# Patient Record
Sex: FEMALE | Race: AMERICAN INDIAN OR ALASKA NATIVE | NOT HISPANIC OR LATINO | Employment: OTHER | URBAN - METROPOLITAN AREA
[De-identification: names, ages, dates, MRNs, and addresses within clinical notes are randomized per-mention and may not be internally consistent; named-entity substitution may affect disease eponyms.]

---

## 2023-08-08 ENCOUNTER — HOSPITAL ENCOUNTER (OUTPATIENT)
Dept: NEUROLOGY | Facility: CLINIC | Age: 68
Setting detail: OBSERVATION
Discharge: HOME | DRG: 100 | End: 2023-08-08
Attending: PSYCHIATRY & NEUROLOGY
Payer: COMMERCIAL

## 2023-08-08 ENCOUNTER — APPOINTMENT (OUTPATIENT)
Dept: CT IMAGING | Facility: CLINIC | Age: 68
DRG: 100 | End: 2023-08-08
Attending: EMERGENCY MEDICINE
Payer: COMMERCIAL

## 2023-08-08 ENCOUNTER — APPOINTMENT (OUTPATIENT)
Dept: MRI IMAGING | Facility: CLINIC | Age: 68
DRG: 100 | End: 2023-08-08
Attending: HOSPITALIST
Payer: COMMERCIAL

## 2023-08-08 ENCOUNTER — HOSPITAL ENCOUNTER (INPATIENT)
Facility: CLINIC | Age: 68
LOS: 2 days | Discharge: HOME | DRG: 100 | End: 2023-08-11
Attending: EMERGENCY MEDICINE | Admitting: HOSPITALIST
Payer: COMMERCIAL

## 2023-08-08 ENCOUNTER — APPOINTMENT (OUTPATIENT)
Dept: GENERAL RADIOLOGY | Facility: CLINIC | Age: 68
DRG: 100 | End: 2023-08-08
Attending: HOSPITALIST
Payer: COMMERCIAL

## 2023-08-08 DIAGNOSIS — R56.9 SEIZURES (H): ICD-10-CM

## 2023-08-08 DIAGNOSIS — I10 BENIGN ESSENTIAL HYPERTENSION: ICD-10-CM

## 2023-08-08 DIAGNOSIS — U07.1 INFECTION DUE TO 2019 NOVEL CORONAVIRUS: Primary | ICD-10-CM

## 2023-08-08 DIAGNOSIS — E78.5 HYPERLIPIDEMIA, UNSPECIFIED HYPERLIPIDEMIA TYPE: ICD-10-CM

## 2023-08-08 LAB
ALBUMIN SERPL BCG-MCNC: 4.5 G/DL (ref 3.5–5.2)
ALP SERPL-CCNC: 78 U/L (ref 35–104)
ALT SERPL W P-5'-P-CCNC: 27 U/L (ref 0–50)
ANION GAP SERPL CALCULATED.3IONS-SCNC: 14 MMOL/L (ref 7–15)
AST SERPL W P-5'-P-CCNC: 27 U/L (ref 0–45)
BASOPHILS # BLD AUTO: 0 10E3/UL (ref 0–0.2)
BASOPHILS NFR BLD AUTO: 0 %
BILIRUB DIRECT SERPL-MCNC: <0.2 MG/DL (ref 0–0.3)
BILIRUB SERPL-MCNC: 0.2 MG/DL
BUN SERPL-MCNC: 17.1 MG/DL (ref 8–23)
CALCIUM SERPL-MCNC: 9.3 MG/DL (ref 8.8–10.2)
CHLORIDE SERPL-SCNC: 104 MMOL/L (ref 98–107)
CREAT SERPL-MCNC: 0.65 MG/DL (ref 0.51–0.95)
DEPRECATED HCO3 PLAS-SCNC: 24 MMOL/L (ref 22–29)
EOSINOPHIL # BLD AUTO: 0.1 10E3/UL (ref 0–0.7)
EOSINOPHIL NFR BLD AUTO: 1 %
ERYTHROCYTE [DISTWIDTH] IN BLOOD BY AUTOMATED COUNT: 12.6 % (ref 10–15)
ETHANOL SERPL-MCNC: <0.01 G/DL
GFR SERPL CREATININE-BSD FRML MDRD: >90 ML/MIN/1.73M2
GLUCOSE BLDC GLUCOMTR-MCNC: 131 MG/DL (ref 70–99)
GLUCOSE BLDC GLUCOMTR-MCNC: 134 MG/DL (ref 70–99)
GLUCOSE SERPL-MCNC: 128 MG/DL (ref 70–99)
HCT VFR BLD AUTO: 40.2 % (ref 35–47)
HGB BLD-MCNC: 12.8 G/DL (ref 11.7–15.7)
HOLD SPECIMEN: NORMAL
HOLD SPECIMEN: NORMAL
IMM GRANULOCYTES # BLD: 0.1 10E3/UL
IMM GRANULOCYTES NFR BLD: 1 %
LYMPHOCYTES # BLD AUTO: 1.3 10E3/UL (ref 0.8–5.3)
LYMPHOCYTES NFR BLD AUTO: 14 %
MCH RBC QN AUTO: 28.3 PG (ref 26.5–33)
MCHC RBC AUTO-ENTMCNC: 31.8 G/DL (ref 31.5–36.5)
MCV RBC AUTO: 89 FL (ref 78–100)
MONOCYTES # BLD AUTO: 0.7 10E3/UL (ref 0–1.3)
MONOCYTES NFR BLD AUTO: 7 %
NEUTROPHILS # BLD AUTO: 7.3 10E3/UL (ref 1.6–8.3)
NEUTROPHILS NFR BLD AUTO: 77 %
NRBC # BLD AUTO: 0 10E3/UL
NRBC BLD AUTO-RTO: 0 /100
PLATELET # BLD AUTO: 213 10E3/UL (ref 150–450)
POTASSIUM SERPL-SCNC: 3.8 MMOL/L (ref 3.4–5.3)
PROT SERPL-MCNC: 7 G/DL (ref 6.4–8.3)
RBC # BLD AUTO: 4.53 10E6/UL (ref 3.8–5.2)
SODIUM SERPL-SCNC: 142 MMOL/L (ref 136–145)
TROPONIN T SERPL HS-MCNC: 10 NG/L
WBC # BLD AUTO: 9.5 10E3/UL (ref 4–11)

## 2023-08-08 PROCEDURE — 96375 TX/PRO/DX INJ NEW DRUG ADDON: CPT

## 2023-08-08 PROCEDURE — 258N000003 HC RX IP 258 OP 636: Performed by: EMERGENCY MEDICINE

## 2023-08-08 PROCEDURE — 250N000011 HC RX IP 250 OP 636: Mod: JZ | Performed by: PSYCHIATRY & NEUROLOGY

## 2023-08-08 PROCEDURE — 85025 COMPLETE CBC W/AUTO DIFF WBC: CPT | Performed by: EMERGENCY MEDICINE

## 2023-08-08 PROCEDURE — 82077 ASSAY SPEC XCP UR&BREATH IA: CPT | Performed by: EMERGENCY MEDICINE

## 2023-08-08 PROCEDURE — 82248 BILIRUBIN DIRECT: CPT | Performed by: EMERGENCY MEDICINE

## 2023-08-08 PROCEDURE — 82310 ASSAY OF CALCIUM: CPT | Performed by: EMERGENCY MEDICINE

## 2023-08-08 PROCEDURE — 70450 CT HEAD/BRAIN W/O DYE: CPT

## 2023-08-08 PROCEDURE — 250N000013 HC RX MED GY IP 250 OP 250 PS 637: Performed by: HOSPITALIST

## 2023-08-08 PROCEDURE — 82962 GLUCOSE BLOOD TEST: CPT

## 2023-08-08 PROCEDURE — 71046 X-RAY EXAM CHEST 2 VIEWS: CPT

## 2023-08-08 PROCEDURE — 258N000003 HC RX IP 258 OP 636: Performed by: HOSPITALIST

## 2023-08-08 PROCEDURE — 250N000011 HC RX IP 250 OP 636: Mod: JZ | Performed by: EMERGENCY MEDICINE

## 2023-08-08 PROCEDURE — 80053 COMPREHEN METABOLIC PANEL: CPT | Performed by: EMERGENCY MEDICINE

## 2023-08-08 PROCEDURE — 36415 COLL VENOUS BLD VENIPUNCTURE: CPT | Performed by: EMERGENCY MEDICINE

## 2023-08-08 PROCEDURE — 99285 EMERGENCY DEPT VISIT HI MDM: CPT | Mod: 25

## 2023-08-08 PROCEDURE — G0378 HOSPITAL OBSERVATION PER HR: HCPCS

## 2023-08-08 PROCEDURE — 255N000002 HC RX 255 OP 636: Performed by: EMERGENCY MEDICINE

## 2023-08-08 PROCEDURE — 96365 THER/PROPH/DIAG IV INF INIT: CPT

## 2023-08-08 PROCEDURE — A9585 GADOBUTROL INJECTION: HCPCS | Performed by: EMERGENCY MEDICINE

## 2023-08-08 PROCEDURE — 80048 BASIC METABOLIC PNL TOTAL CA: CPT | Performed by: EMERGENCY MEDICINE

## 2023-08-08 PROCEDURE — 93005 ELECTROCARDIOGRAM TRACING: CPT

## 2023-08-08 PROCEDURE — 84484 ASSAY OF TROPONIN QUANT: CPT | Performed by: EMERGENCY MEDICINE

## 2023-08-08 PROCEDURE — 250N000011 HC RX IP 250 OP 636

## 2023-08-08 PROCEDURE — 95816 EEG AWAKE AND DROWSY: CPT

## 2023-08-08 PROCEDURE — 99223 1ST HOSP IP/OBS HIGH 75: CPT | Performed by: HOSPITALIST

## 2023-08-08 PROCEDURE — 70553 MRI BRAIN STEM W/O & W/DYE: CPT

## 2023-08-08 PROCEDURE — 96376 TX/PRO/DX INJ SAME DRUG ADON: CPT

## 2023-08-08 RX ORDER — LORAZEPAM 2 MG/ML
0.5 INJECTION INTRAMUSCULAR ONCE
Status: COMPLETED | OUTPATIENT
Start: 2023-08-08 | End: 2023-08-08

## 2023-08-08 RX ORDER — LANOLIN ALCOHOL/MO/W.PET/CERES
3 CREAM (GRAM) TOPICAL
COMMUNITY

## 2023-08-08 RX ORDER — LOSARTAN POTASSIUM 25 MG/1
25 TABLET ORAL 2 TIMES DAILY
Status: ON HOLD | COMMUNITY
End: 2023-08-11

## 2023-08-08 RX ORDER — ACETAMINOPHEN 650 MG/1
650 SUPPOSITORY RECTAL EVERY 6 HOURS PRN
Status: DISCONTINUED | OUTPATIENT
Start: 2023-08-08 | End: 2023-08-11 | Stop reason: HOSPADM

## 2023-08-08 RX ORDER — ONDANSETRON 4 MG/1
4 TABLET, ORALLY DISINTEGRATING ORAL EVERY 6 HOURS PRN
Status: DISCONTINUED | OUTPATIENT
Start: 2023-08-08 | End: 2023-08-11 | Stop reason: HOSPADM

## 2023-08-08 RX ORDER — METOPROLOL TARTRATE 100 MG
50 TABLET ORAL 2 TIMES DAILY
Status: ON HOLD | COMMUNITY
End: 2023-08-11

## 2023-08-08 RX ORDER — SODIUM CHLORIDE 9 MG/ML
INJECTION, SOLUTION INTRAVENOUS CONTINUOUS
Status: DISCONTINUED | OUTPATIENT
Start: 2023-08-08 | End: 2023-08-10

## 2023-08-08 RX ORDER — LORAZEPAM 2 MG/ML
2 INJECTION INTRAMUSCULAR EVERY 6 HOURS PRN
Status: DISCONTINUED | OUTPATIENT
Start: 2023-08-08 | End: 2023-08-11 | Stop reason: HOSPADM

## 2023-08-08 RX ORDER — GADOBUTROL 604.72 MG/ML
8 INJECTION INTRAVENOUS ONCE
Status: COMPLETED | OUTPATIENT
Start: 2023-08-08 | End: 2023-08-08

## 2023-08-08 RX ORDER — LORAZEPAM 2 MG/ML
INJECTION INTRAMUSCULAR
Status: COMPLETED
Start: 2023-08-08 | End: 2023-08-08

## 2023-08-08 RX ORDER — LEVETIRACETAM 10 MG/ML
1000 INJECTION INTRAVASCULAR ONCE
Status: COMPLETED | OUTPATIENT
Start: 2023-08-08 | End: 2023-08-08

## 2023-08-08 RX ORDER — FLUTICASONE PROPIONATE 50 MCG
1 SPRAY, SUSPENSION (ML) NASAL DAILY
COMMUNITY

## 2023-08-08 RX ORDER — METOPROLOL TARTRATE 50 MG
50 TABLET ORAL ONCE
Status: COMPLETED | OUTPATIENT
Start: 2023-08-08 | End: 2023-08-08

## 2023-08-08 RX ORDER — AMLODIPINE BESYLATE 2.5 MG/1
5 TABLET ORAL DAILY
Status: ON HOLD | COMMUNITY
End: 2023-08-11

## 2023-08-08 RX ORDER — CARBOXYMETHYLCELLULOSE SODIUM 5 MG/ML
1 SOLUTION/ DROPS OPHTHALMIC DAILY
COMMUNITY

## 2023-08-08 RX ORDER — LOSARTAN POTASSIUM 25 MG/1
25 TABLET ORAL ONCE
Status: COMPLETED | OUTPATIENT
Start: 2023-08-08 | End: 2023-08-08

## 2023-08-08 RX ORDER — ACETAMINOPHEN 325 MG/1
650 TABLET ORAL EVERY 6 HOURS PRN
Status: DISCONTINUED | OUTPATIENT
Start: 2023-08-08 | End: 2023-08-11 | Stop reason: HOSPADM

## 2023-08-08 RX ORDER — ONDANSETRON 2 MG/ML
4 INJECTION INTRAMUSCULAR; INTRAVENOUS EVERY 6 HOURS PRN
Status: DISCONTINUED | OUTPATIENT
Start: 2023-08-08 | End: 2023-08-11 | Stop reason: HOSPADM

## 2023-08-08 RX ORDER — LEVETIRACETAM 10 MG/ML
1000 INJECTION INTRAVASCULAR EVERY 12 HOURS
Status: DISCONTINUED | OUTPATIENT
Start: 2023-08-09 | End: 2023-08-09

## 2023-08-08 RX ORDER — LOVASTATIN 40 MG
40 TABLET ORAL AT BEDTIME
Status: ON HOLD | COMMUNITY
End: 2023-08-11

## 2023-08-08 RX ORDER — LEVETIRACETAM 750 MG/1
750 TABLET ORAL 2 TIMES DAILY
Status: DISCONTINUED | OUTPATIENT
Start: 2023-08-08 | End: 2023-08-08

## 2023-08-08 RX ADMIN — METOPROLOL TARTRATE 50 MG: 50 TABLET, FILM COATED ORAL at 22:16

## 2023-08-08 RX ADMIN — LORAZEPAM 0.5 MG: 2 INJECTION INTRAMUSCULAR; INTRAVENOUS at 08:27

## 2023-08-08 RX ADMIN — SODIUM CHLORIDE: 9 INJECTION, SOLUTION INTRAVENOUS at 21:09

## 2023-08-08 RX ADMIN — GADOBUTROL 8 ML: 604.72 INJECTION INTRAVENOUS at 20:41

## 2023-08-08 RX ADMIN — LOSARTAN POTASSIUM 25 MG: 25 TABLET, FILM COATED ORAL at 22:16

## 2023-08-08 RX ADMIN — LEVETIRACETAM 1000 MG: 10 INJECTION INTRAVENOUS at 19:34

## 2023-08-08 RX ADMIN — LORAZEPAM 0.5 MG: 2 INJECTION INTRAMUSCULAR at 08:27

## 2023-08-08 RX ADMIN — LEVETIRACETAM 1500 MG: 100 INJECTION, SOLUTION INTRAVENOUS at 08:10

## 2023-08-08 ASSESSMENT — ACTIVITIES OF DAILY LIVING (ADL)
ADLS_ACUITY_SCORE: 35
ADLS_ACUITY_SCORE: 34
ADLS_ACUITY_SCORE: 35

## 2023-08-08 NOTE — LETTER
[facility] NEUROSCIENCE UNIT  [address] [place] [address]  Phone: [phone]    August 9, 2023        Nasrin Singer  [address] [place] [address]          To whom it may concern:    RE: Nasrin Singer    This patient was hospitalized between 8/8/23 - 8/9/23 with medical illness and is hence unable to travel on 8/9/23.    Please contact me for questions or concerns.      Sincerely,      Martine Donaldson MD  Internal Medicine Hospitalist

## 2023-08-08 NOTE — ED NOTES
Patient unsuccessfully trailed off oxygen, remains on 4L via oxymask. Continues to wake up easily to voice, complaining of feeling tired.

## 2023-08-08 NOTE — ED NOTES
Patient resting in bed.  Awakes easily but still is very tired.  Requesting to go to the bathroom, commode brought to room.

## 2023-08-08 NOTE — PROGRESS NOTES
EEG portable done in ED.  Cooperative, awake, alert, drowsy, sleepy pt, photic done and n/c noted in EEG  DR Fields following

## 2023-08-08 NOTE — PHARMACY-ADMISSION MEDICATION HISTORY
Pharmacist Admission Medication History    Admission medication history is complete. The information provided in this note is only as accurate as the sources available at the time of the update.    Medication reconciliation/reorder completed by provider prior to medication history? No    Information Source(s): Patient and CareEverywhere/SureScripts via in-person    Pertinent Information:     Changes made to PTA medication list:  Added: None  Deleted: None  Changed: None    Medication Affordability:       Allergies reviewed with patient and updates made in EHR: no    Medication History Completed By: Alissa Sanchez RPH 8/8/2023 11:47 AM    Prior to Admission medications    Medication Sig Last Dose Taking? Auth Provider Long Term End Date   Acetaminophen 325 MG CAPS Take 325-650 mg by mouth every 4 hours as needed  Yes Unknown, Entered By History     amLODIPine (NORVASC) 2.5 MG tablet Take 5 mg by mouth daily 8/7/2023 Yes Unknown, Entered By History Yes    Ascorbic Acid (VITAMIN C) 500 MG CHEW Take 1 tablet by mouth 2 times daily 8/7/2023 Yes Unknown, Entered By History     calcium carbonate-vitamin D (OSCAL) 500-5 MG-MCG tablet Take 1 tablet by mouth 2 times daily 8/7/2023 Yes Unknown, Entered By History     carboxymethylcellulose PF (REFRESH PLUS) 0.5 % ophthalmic solution Place 1 drop into both eyes daily 8/7/2023 Yes Unknown, Entered By History     fluticasone (FLONASE) 50 MCG/ACT nasal spray Spray 1 spray into both nostrils daily 8/7/2023 at me Yes Unknown, Entered By History     losartan (COZAAR) 25 MG tablet Take 25 mg by mouth 2 times daily 8/7/2023 Yes Unknown, Entered By History Yes    lovastatin (MEVACOR) 40 MG tablet Take 40 mg by mouth At Bedtime 8/7/2023 Yes Unknown, Entered By History Yes    melatonin 3 MG tablet Take 3 mg by mouth nightly as needed for sleep  Yes Unknown, Entered By History     metoprolol tartrate (LOPRESSOR) 100 MG tablet Take 50 mg by mouth 2 times daily 8/7/2023 Yes Unknown, Entered  By History Yes      Alissa EdwardsD

## 2023-08-08 NOTE — ED NOTES
[facility]  ED Nurse Handoff Report    ED Chief complaint: Syncope      ED Diagnosis:   Final diagnoses:   None       Code Status: Full Code    Allergies: No Known Allergies    Patient Story: Patient is from [place] here on layover.  Found by bystanders laying on bathroom floor acting strange.     Focused Assessment:  Patient did have a witnessed seizure here in the ER after walking to the bathroom.  She was in the bed when this happened. She is now sleepy but answering questions appropriately.  Similar episode happened a few months ago, abnormal EEG in June.  Not taking any seizure meds.  Denies heavy alcohol use.     Treatments and/or interventions provided: IV, labs, CT, meds   Patient's response to treatments and/or interventions: tolerated well     To be done/followed up on inpatient unit:  see orders     Does this patient have any cognitive concerns?:  none     Activity level - Baseline/Home:  Independent  Activity Level - Current:   Stand with Assist    Patient's Preferred language: English   Needed?: No    Isolation: None  Infection: Not Applicable  Patient tested for COVID 19 prior to admission: NO  Bariatric?: No    Vital Signs:   Vitals:    08/08/23 0644 08/08/23 0745 08/08/23 0815 08/08/23 0830   BP:  136/67 127/77 121/71   Pulse:  89 92 81   Resp:  16 26 17   Temp:       TempSrc:       SpO2:  98% 94% 96%   Weight: 79.4 kg (175 lb)          Cardiac Rhythm:     Was the PSS-3 completed:   Yes  What interventions are required if any?               Family Comments: none   OBS brochure/video discussed/provided to patient/family: N/A              Name of person given brochure if not patient:               Relationship to patient:     For the majority of the shift this patient's behavior was Green.   Behavioral interventions performed were .    ED NURSE PHONE NUMBER: *[phone]

## 2023-08-08 NOTE — ED TRIAGE NOTES
"Pt arrives via EMS from airport presenting after a syncopal episode. Per EMS, pt is traveling from [place] to [place] and was found at on the bathroom floor \"passed out\" by a bystander. When EMS arrived, pt was alert but not oriented to situation. Pt was not post-ictal per EMS, and is now presenting clear in ED. Pt states \"I don't know what happened, but sometimes stuff like this happens to me.\"       Triage Assessment       Row Name 08/08/23 0637       Triage Assessment (Adult)    Airway WDL WDL       Respiratory WDL    Respiratory WDL WDL       Skin Circulation/Temperature WDL    Skin Circulation/Temperature WDL WDL       Cardiac WDL    Cardiac WDL WDL       Peripheral/Neurovascular WDL    Peripheral Neurovascular WDL WDL       Cognitive/Neuro/Behavioral WDL    Cognitive/Neuro/Behavioral WDL WDL                    "

## 2023-08-08 NOTE — ED PROVIDER NOTES
History   Chief Complaint:  Possible Syncope     The history is provided by the patient, the EMS personnel and medical records.      Nasrin Singer is a 68 year old female with history of hydrocephalus, subarachnoid hemorrhage, and hypertension who presents via EMS from [facility] airport after a possible syncopal episode. According to EMS report, the patient was found lying down on the bathroom floor by a bystander. The patient was not oriented, but was responsive. The patient felt shaky, but was otherwise feeling well. EMS states the patient was not post-ictal. She has no known seizure history.     After about an hour here in the emergency department, the patient had a seizure that lasted about a minute.     Independent Historian:   EMS Report - Supplemental history above. Blood sugar was 140.    Review of External Notes:   I reviewed the patient's neurology visit on 06/05/23 for abnormal EEG. I reviewed the neurology visit on 06/21/23.     EEG REPORT   DATE: 6/5/2023  DEPT: Neurology  FACILITY: [facility]   IMPRESSION:   Abnormal EEG due to the presence of generalized, intermittent, high amplitude, rhythmic sharp and slowing which is consistent with global cerebral dysfunction but is nonspecific as to etiology. At times there was high amplitude rhythmic delta slowing seen over the left frontal region consistent with FIRDA. There were no clear epileptiform discharges or electrographic seizures.  Bandar Canada MD     Medications:    Norvasc   Cozaar  Mevacor  Lopressor     Past Medical History:    Hydrocephalus   GERD  Lumbar spondylosis   Hypertension  Hyperlipidemia   Bilateral piriformis syndrome  Bilateral iliotibial band syndrome  Subarachnoid hemorrhage   Obstructive sleep apnea     Past Surgical History:    Hysterectomy      Physical Exam   Patient Vitals for the past 24 hrs:   BP Temp Temp src Pulse Resp SpO2 Weight   08/08/23 0845 102/60 -- -- 73 16 96 % --   08/08/23 0830 121/71 -- -- 81 17 96 % --   08/08/23  "0815 127/77 -- -- 92 26 94 % --   08/08/23 0745 136/67 -- -- 89 16 98 % --   08/08/23 0644 -- -- -- -- -- -- 79.4 kg (175 lb)   08/08/23 0638 (!) 154/81 98.6  F (37  C) Oral 82 10 98 % --      Physical Exam  Nursing note and vitals reviewed.  Constitutional:  Upon initial evaluation minimally responsive.  However later on she was awake and appropriate.   HENT:   Head:   Atraumatic.  Nose:    Nose normal.   Mouth/Throat:   Mucous membranes are normal.   Eyes:    Conjunctivae normal and EOM are normal.      Pupils are equal, round, and reactive to light.   Neck:    Trachea normal.   Cardiovascular:  Normal rate, regular rhythm, normal heart sounds and normal pulses. No murmur heard.  Pulmonary/Chest:  Effort normal and breath sounds normal.   Abdominal:   Soft. Normal appearance and bowel sounds are normal.      There is no tenderness.      There is no rebound and no CVA tenderness.   Musculoskeletal:  Extremities atraumatic x 4.   Lymphadenopathy:  No cervical adenopathy.   Neurological:   Awake and alert. Normal strength.      No cranial nerve deficit or sensory deficit. GCS eye subscore is 4. GCS verbal subscore is 5. GCS motor subscore is 6.   Skin:    Skin is intact. No rash noted.   Psychiatric:   Normal mood and affect.    Emergency Department Course   ECG  ECG taken at 0647, ECG read at 0650  Normal sinus rhythm  RSR\" or QR pattern in V1 suggests right ventricular conduction delay  Nonspecific T wave abnormality   Abnormal ECG   Rate 87 bpm. MT interval 192 ms. QRS duration 90 ms. QT/QTc 344/413 ms. P-R-T axes 83 -20 37.     Imaging:  CT Head w/o Contrast   Final Result   IMPRESSION:    1. No acute intracranial pathology.   2. Mild leukoaraiosis, presumed sequela of chronic microvascular   ischemic disease.      BRANDAN BARKLEY             SYSTEM ID:  [id number]      Report per radiology    Laboratory:  Labs Ordered and Resulted from Time of ED Arrival to Time of ED Departure   BASIC METABOLIC PANEL - Abnormal "       Result Value    Sodium 142      Potassium 3.8      Chloride 104      Carbon Dioxide (CO2) 24      Anion Gap 14      Urea Nitrogen 17.1      Creatinine 0.65      Calcium 9.3      Glucose 128 (*)     GFR Estimate >90     GLUCOSE BY METER - Abnormal    GLUCOSE BY METER POCT 134 (*)    TROPONIN T, HIGH SENSITIVITY - Normal    Troponin T, High Sensitivity 10     ETHYL ALCOHOL LEVEL - Normal    Alcohol ethyl <0.01     HEPATIC FUNCTION PANEL - Normal    Protein Total 7.0      Albumin 4.5      Bilirubin Total 0.2      Alkaline Phosphatase 78      AST 27      ALT 27      Bilirubin Direct <0.20     CBC WITH PLATELETS AND DIFFERENTIAL    WBC Count 9.5      RBC Count 4.53      Hemoglobin 12.8      Hematocrit 40.2      MCV 89      MCH 28.3      MCHC 31.8      RDW 12.6      Platelet Count 213      % Neutrophils 77      % Lymphocytes 14      % Monocytes 7      % Eosinophils 1      % Basophils 0      % Immature Granulocytes 1      NRBCs per 100 WBC 0      Absolute Neutrophils 7.3      Absolute Lymphocytes 1.3      Absolute Monocytes 0.7      Absolute Eosinophils 0.1      Absolute Basophils 0.0      Absolute Immature Granulocytes 0.1      Absolute NRBCs 0.0        Emergency Department Course & Assessments:     Interventions:  Medications   levETIRAcetam (KEPPRA) 1,500 mg in sodium chloride 0.9 % 100 mL intermittent infusion (0 mg Intravenous Stopped 8/8/23 0837)   LORazepam (ATIVAN) injection 0.5 mg (0.5 mg Intravenous $Given 8/8/23 0827)      Assessments:  0738 RN notified MD that patient was seizing.   0739 Code blue was called.   0743 I obtained history and performed an exam of the patient as documented above.  0907 I rechecked the patient and discussed plan of care. Patient seems more alert.     Independent Interpretation (X-rays, CTs, rhythm strip):  None    Consultations/Discussion of Management or Tests:  0913 I spoke with Dr. Donaldson of the hospitalist service who accepts the patient for admission.     Social  Determinants of Health affecting care:   None    Disposition:  The patient was admitted to the hospital under the care of Dr. Donaldson.     Impression & Plan    Medical Decision Making:  This is a 68-year-old female brought in by EMS from the airport after she was found on the bathroom floor at the airport.  Upon her initial evaluation by my student, she was awake and alert and talking.  She had already been up and walking as well.  However she then had a tonic-clonic seizure witnessed by staff.  This stopped on its own without any intervention.  However she was provided a small dose of IV Ativan later on to help prevent any additional seizures.  She was also loaded with Keppra.  She had the above work-up here as well including an EKG, blood work, and CT scan of her head.  I reviewed her chart from [place], and it appears that she has had 2 prior episodes that were unwitnessed.  She has been seen by cardiology and neurology there without a definitive diagnosis.  However it appears that she likely does have a seizure disorder, which is likely the cause of those previous episodes.  I feel it is best that she be admitted to the hospital here for further evaluation and management.  I subsequently spoke with Dr. Donaldson, who will be taking care of her.    Diagnosis:    ICD-10-CM    1. Seizures (H)  R56.9          Scribe Disclosure:  I, Zulma Smith, am serving as a scribe at 7:48 AM on 8/8/2023 to document services personally performed by Dae Vela MD based on my observations and the provider's statements to me.      Dae Vela MD  08/08/23 0332

## 2023-08-08 NOTE — H&P
[facility]    History and Physical  Hospitalist       Date of Admission:  8/8/2023    Assessment & Plan   Nasrin Singer is a 68 year old female with a history of HTN, prior cryptogenic SAH, JABARI, HLD who presents after being found down on the bathroom floor in the airport and had witnessed seizure episode in the ED.    Seizure, new diagnosis  Postictal encephalopathy  Patient was traveling back home from [place] to [place] with a layover in [place].  She remembers going into the bathroom in the airport around 4:30 AM.  She thinks she must have passed out in the bathroom and may have been down for about 30 minutes when she was found by a bystander.  EMS found her alert but not oriented to situation.  In the ED, she was noted to have stable vitals, .  EKG with no acute findings.  Head CT with no acute findings.  Labs including troponin T, Ethyl alcohol completely unremarkable.  Patient had a witnessed generalized tonic-clonic seizure episode in the ED lasting about 30 to 60 seconds which terminated spontaneously.  Patient was given 0.5 Mg IV Ativan, also loaded with 1500 Mg IV Keppra and is admitted under observation status for further management.  She had an unexplained SAH in 2019.  She has also had 2 episodes of loss of awareness, first on 8/2020, second on 4/2023.  She has undergone cardiac and neurological evaluation for these.  Per last neurology note dated 6/21/2023: She recently obtained an EEG which was without epileptic activity but notable left frontal intermittent rhythmic delta activity, intermittent generalized slowing. She also underwent 30 day cardiac monitor and TTE recently with no acute findings to explain these episodes.    -Registered observation status  -Consult to neurology  -Neurochecks  -Seizure precautions, as needed IV Ativan available  -MRI brain ordered.  Last MRI brain was in 2019 after she had cryptogenic SAH.  -Defer antiepileptic drug  management to neurology  -IV NS hydration    H/o Cryptogenic SAH  Patient was admitted on 9/28/19 for nontraumatic subarachnoid hemorrhage. She presented to the hospital after sudden onset 10/10 headache. In th ED was diagnosed with SAH. Patient had two catheter angiograms and brain/cervical spine MRI and all unrevealing for vascular anomaly but first dsa complicated by left posterior cerebral artery strokes and right frontal subcortical stroke s/p with Integrilin; clinically silent.   -Noted    JABARI  -Does not appear to be on treatment    HLD  -Continue PTA meds when verified per pharmacy    HTN  -Continue PTA meds when verified per pharmacy    DVT Prophylaxis: Low Risk/Ambulatory with no VTE prophylaxis indicated  Code Status: Full Code     Expected Discharge Date: 08/09/2023               Martine Donaldson MD    Medical Decision Making       Please see A&P for additional details of medical decision making.         Clinically Significant Risk Factors Present on Admission                                  Primary Care Physician   Physician No Ref-Primary    Chief Complaint   Found down on the floor    History is obtained from the patient, ED provider and chart review    History of Present Illness   Nasrin Singer is a 68 year old female with a history of HTN, prior cryptogenic SAH, JABARI, HLD who presents after being found down on the bathroom floor in the airport and had witnessed seizure episode in the ED.    Patient was traveling back home from [place] to [place] with a layover in [place].  She remembers going into the bathroom in the airport around 4:30 AM.  She thinks she must have passed out in the bathroom and may have been down for about 30 minutes when she was found by a bystander.  EMS found her alert but not oriented to situation.  In the ED, she was noted to have stable vitals, .  EKG with no acute findings.  Head CT with no acute findings.  Labs including troponin T, Ethyl alcohol  "completely unremarkable.  Patient had a witnessed generalized tonic-clonic seizure episode in the ED lasting about 30 to 60 seconds which terminated spontaneously.  Patient was given 0.5 Mg IV Ativan, also loaded with 1500 Mg IV Keppra and is admitted under observation status for further management.    Patient does not smoke.  Drinks alcohol occasionally.  Lives alone outside [place].  Is a retired nurse.    She had an unexplained SAH in 2019.  She has also had 2 episodes of loss of awareness, first on 8/2020, second on 4/2023.  She has undergone cardiac and neurological evaluation for these.  Per last neurology note dated 6/21/2023: During first event she had zero prodrome and then presumed fell over forward into table then to the floor, woke up, felt \"shaky\", denies confusion, feels was out for 2 hours, no muscle soreness but had bruising from the fall, no incontinence, no tongue biting. The next day woke up and was feeling well. First episode was felt to be a hypotensive episode and her blood pressure medications were reduced, stopped 2 anti-HTN and continued metoprolol, had + orthostatics with a drop of 10 on SBP. Eventually started losartan/amlodipine, remains on metoprolol. Her BPs at home 108-135, denies orthostasis.  During second event notes identical semiology, no prodrome, fell, woke up feeling relatively well beyond feeling shaky and bruised, but felt had lost 2 hours.  She recently obtained an EEG which was without epileptic activity but notable left frontal intermittent rhythmic delta activity, intermittent generalized slowing. She also underwent 30 day cardiac monitor and TTE recently with no acute findings to explain these episodes.      Past Medical History    I have reviewed this patient's medical history and updated it with pertinent information if needed.   HTN  SAH  HLD  JABARI    Past Surgical History   I have reviewed this patient's surgical history and updated it with pertinent information " if needed.  Total hysterectomy    Prior to Admission Medications   None     Allergies   No Known Allergies    Social History   I have reviewed this patient's social history and updated it with pertinent information if needed. Nasrin Singer  does not smoke, occasionally drinks alcohol, is a retired nurse    Family History   Family history reviewed with patient and is noncontributory.    Review of Systems   The 10 point Review of Systems is negative other than noted in the HPI or here.     Physical Exam   Temp: 98.6  F (37  C) Temp src: Oral BP: 102/60 Pulse: 73   Resp: 16 SpO2: 96 % O2 Device: None (Room air)    Vital Signs with Ranges  Temp:  [98.6  F (37  C)] 98.6  F (37  C)  Pulse:  [73-92] 73  Resp:  [10-26] 16  BP: (102-154)/(60-81) 102/60  SpO2:  [94 %-98 %] 96 %  175 lbs 0 oz    Constitutional: cooperative, no apparent distress.  Patient is drowsy but easily arousable  Eyes: no icterus, EOMs intact  HEENT: Moist mucous membranes  Respiratory: Clear to auscultation bilaterally, no crackles or wheezing.  On 2 L nasal cannula oxygen  Cardiovascular: Regular rate and rhythm, normal S1 and S2, and no murmur noted.  GI: Soft, non-distended, non-tender, normal bowel sounds.  Skin: No rashes, no cyanosis, no edema.  Musculoskeletal: No joint swelling, erythema or tenderness.  Neurologic: Drowsy but easily arousable, somewhat slow to respond but gets the right response after multiple attempts, engages in appropriate conversation, no facial asymmetry, moving all extremities, fluent speech  Psychiatric: Calm and pleasant, no obvious anxiety or depression.     Data   Data reviewed today:  I personally reviewed CT scan with result as noted above  Recent Labs   Lab 08/08/23  0748 08/08/23  0650   WBC  --  9.5   HGB  --  12.8   MCV  --  89   PLT  --  213   NA  --  142   POTASSIUM  --  3.8   CHLORIDE  --  104   CO2  --  24   BUN  --  17.1   CR  --  0.65   ANIONGAP  --  14   NORBERTO  --  9.3   * 128*   ALBUMIN  --  4.5    PROTTOTAL  --  7.0   BILITOTAL  --  0.2   ALKPHOS  --  78   ALT  --  27   AST  --  27       Recent Results (from the past 24 hour(s))   CT Head w/o Contrast    Narrative    EXAM: CT HEAD W/O CONTRAST  8/8/2023 8:08 AM     HISTORY: seizures       COMPARISON: None    TECHNIQUE: Using multidetector thin collimation helical acquisition  technique, axial, coronal and sagittal CT images from the skull base  to the vertex were obtained without intravenous contrast.   (topogram) image(s) also obtained and reviewed.    FINDINGS:  No acute intracranial hemorrhage, mass effect, or midline shift.  Scattered areas of subcortical white matter hypoattenuation likely  related to chronic small vessel ischemic disease. Suspect old  cerebellar lacunar infarct. No CT findings of acute infarct or  hydrocephalus. Preserved subarachnoid spaces.    Right parietal scalp hematoma. No underlying fracture. No substantial  paranasal sinus mucosal disease. Clear mastoid air cells. Nonfocal  orbits.       Impression    IMPRESSION:   1. No acute intracranial pathology.  2. Mild leukoaraiosis, presumed sequela of chronic microvascular  ischemic disease.    BRANDAN BARKLEY DO         SYSTEM ID:  [id number]

## 2023-08-08 NOTE — ED NOTES
Patient transported down to CT and back on monitor.  Remains vitally stable.  Opens eyes when called but does not answer questions yet.

## 2023-08-08 NOTE — CONSULTS
DATE OF SERVICE : 8/8/2023    DATE OF ADMISSION: 8/8/2023    NEUROLOGICAL CONSULTATION    REQUESTED BY Dr. Vela, Dae BECKFORD MD    SOURCE OF INFORMATION:Patient and EHR    REASON FOR CONSULTATION:   Seizure    HISTORY OF PRESENT ILLNESS:     She is 68 years old female with history of subarachnoid hemorrhage 2019 hypertension hyperlipidemia obstructive sleep apnea presented to the ED after [place] the airport & had witnessed seizure-like activity in the emergency room.  She was traveling back home from [place] to [place] with a layover in [place] she remembers going into the bathroom she was found down by bystanders in the bathroom. She is sleep deprived.  In the ED vitals were stable head CT with no acute pathology blood alcohol negative she had witnessed generalized tonic seizure in the emergency room lasting for about 30 to 60 seconds did receive IV Ativan 0.5 mg.  S/p Keppra load 1500 mg.  Prior spells of lack of awareness 8/2020 and second spell 4/2023.    Prior similar spells followed by neurologist similar spells 8/2020 and another spell 4/2023 she was unresponsive for about 2 hours no warning prior to loss of consciousness she wakes up after few hours.    Prior MRI head 9/2019 small acute ischemic infarcts involving left parieto-occipital region left cerebellum and right frontal subcortical white matter.  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    Meds:   Current Outpatient Medications   Medication Sig Dispense Refill     Acetaminophen 325 MG CAPS Take 325-650 mg by mouth every 4 hours as needed       amLODIPine (NORVASC) 2.5 MG tablet Take 5 mg by mouth daily       Ascorbic Acid (VITAMIN C) 500 MG CHEW Take 1 tablet by mouth 2 times daily       calcium carbonate-vitamin D (OSCAL) 500-5 MG-MCG tablet Take 1 tablet by mouth 2 times daily       carboxymethylcellulose PF  (REFRESH PLUS) 0.5 % ophthalmic solution Place 1 drop into both eyes daily       fluticasone (FLONASE) 50 MCG/ACT nasal spray Spray 1 spray into both nostrils daily       losartan (COZAAR) 25 MG tablet Take 25 mg by mouth 2 times daily       lovastatin (MEVACOR) 40 MG tablet Take 40 mg by mouth At Bedtime       melatonin 3 MG tablet Take 3 mg by mouth nightly as needed for sleep       metoprolol tartrate (LOPRESSOR) 100 MG tablet Take 50 mg by mouth 2 times daily         Meds:  Current Outpatient Medications   Medication Sig Dispense Refill     Acetaminophen 325 MG CAPS Take 325-650 mg by mouth every 4 hours as needed       amLODIPine (NORVASC) 2.5 MG tablet Take 5 mg by mouth daily       Ascorbic Acid (VITAMIN C) 500 MG CHEW Take 1 tablet by mouth 2 times daily       calcium carbonate-vitamin D (OSCAL) 500-5 MG-MCG tablet Take 1 tablet by mouth 2 times daily       carboxymethylcellulose PF (REFRESH PLUS) 0.5 % ophthalmic solution Place 1 drop into both eyes daily       fluticasone (FLONASE) 50 MCG/ACT nasal spray Spray 1 spray into both nostrils daily       losartan (COZAAR) 25 MG tablet Take 25 mg by mouth 2 times daily       lovastatin (MEVACOR) 40 MG tablet Take 40 mg by mouth At Bedtime       melatonin 3 MG tablet Take 3 mg by mouth nightly as needed for sleep       metoprolol tartrate (LOPRESSOR) 100 MG tablet Take 50 mg by mouth 2 times daily      No Known Allergies      PHYSICAL EXAM  /68   Pulse 76   Temp 98.6  F (37  C) (Oral)   Resp 21   Wt 79.4 kg (175 lb)   SpO2 93%         On general examination the patient was in no acute distress. No labored breathing. Mood and affect was normal  No clubbing,cyanosis, pedal edema in extremities     Neurologic:  Mental status: alert and oriented  to the current situations  fund of knowledge intact.  Intact attention Speech and Language: Comprehension and spontaneous speech are normal. No dysarthria.  Pupils equal and round reacting to light, visual fields  were full, Face -symmetric, Eye closure- intact. Tongue movements- normal ; bruising right lateral aspect of the tongue motor strength- moves all four limbs equally ; reflexes 2+ upper limb 2 left upper limb and lower extremities no tremors or involuntary movements    Lab and X-ray:   Recent Labs   Lab Test 08/08/23  0650   WBC 9.5   HGB 12.8      POTASSIUM 3.8     Recent Labs   Lab Test 08/08/23  0650   POTASSIUM 3.8   CHLORIDE 104   BUN 17.1     Recent Labs   Lab Test 08/08/23  0650   WBC 9.5   HGB 12.8   MCV 89        Recent Labs   Lab Test 08/08/23  0650   AST 27   ALT 27   ALKPHOS 78     No lab results found.    Invalid input(s): CHOLESTEROL, TRIGLYCERIDES  No lab results found.    Laboratory results were personally interpreted and reviewed in detail.  Imaging studies reviewed and interpreted in detail      Summary: List Problems:   Patient Active Problem List   Diagnosis     Seizures (H)       ASSESSMENT:   68-year-old with history of subarachnoid hemorrhage, ischemic infarcts 2019 presenting with stereotypical spells of loss of consciousness for several hours with no aura.  She had a witnessed spell of tonic-clonic seizure activity in ED lasting less than 1 minute.    Stereotypical spells suggestive of underlying seizure disorder    Start Keppra 750 mg twice daily     EEG and MRI pending    Physical therapy    No driving    Follow-up with primary neurologist    Call us with any questions      Thank you for the opportunity to provide consultation on Nasrin Singer

## 2023-08-08 NOTE — PROGRESS NOTES
Neurology    Abnormal EEG with frequent cortical irritability left frontotemporal regions     PLAN    IV Keppra 1000 mg x 1   Increase maintenance Keppra 1000 mg BID     Daniela Fields MD  [facility]  Office Phone [phone]         CC: Warts    HPI: The patient is a 13 year old male who presents for wart\, present for a couple of years.  They are located on the left thumb.      Previous treatment: LN x 2 and OTC wart treatments     PMH:  Healthy    Current Outpatient Medications   Medication Sig Dispense Refill   • gentamicin (GARAMYCIN) 0.3 % ophthalmic solution Place 1 drop into both eyes 4 times daily. 5 mL 0     No current facility-administered medications for this visit.        ALLERGIES:   Allergen Reactions   • Penicillins RASH       PE:  Well-appearing male in no acute distress.  Pate skin type II.  Skin exam included the hands, fingernails, face:  -1 verrucous papules on the left volar thumb    No additional concerning lesions or rash were noted.        Assessment/Plan:  1. Verruca - The diagnosis and etiology was discussed.  Discussed treatment options, and likely need for multiple treatments.    -Rx wart peel (a custom compounded topical) to use nightly. It may cause surrounding skin irritation.  The compounding pharmacy will call the family to obtain payment, then mail the medication    Follow-up PRN    On 10/7/2019, Munira GUERRERO Herberth, MA scribed the services personally performed by Dr. Lisa Muchard  The documentation recorded by the scribe accurately and completely reflects the service(s) I personally performed and the decisions made by me.

## 2023-08-08 NOTE — ED NOTES
Patient had full body seizure like activity lasting about a minute.  She stopped shaking after about a minute and was postictal.  MD called to room.  Ativan not given at this time.  Respirations stable with 4L via oxy mask.

## 2023-08-09 ENCOUNTER — HOSPITAL ENCOUNTER (OUTPATIENT)
Dept: NEUROLOGY | Facility: CLINIC | Age: 68
Setting detail: OBSERVATION
Discharge: HOME | DRG: 100 | End: 2023-08-09
Attending: PSYCHIATRY & NEUROLOGY
Payer: COMMERCIAL

## 2023-08-09 LAB
ATRIAL RATE - MUSE: 87 BPM
DIASTOLIC BLOOD PRESSURE - MUSE: NORMAL MMHG
GLUCOSE BLDC GLUCOMTR-MCNC: 97 MG/DL (ref 70–99)
INTERPRETATION ECG - MUSE: NORMAL
P AXIS - MUSE: 83 DEGREES
PR INTERVAL - MUSE: 192 MS
QRS DURATION - MUSE: 90 MS
QT - MUSE: 344 MS
QTC - MUSE: 413 MS
R AXIS - MUSE: -20 DEGREES
SYSTOLIC BLOOD PRESSURE - MUSE: NORMAL MMHG
T AXIS - MUSE: 37 DEGREES
VENTRICULAR RATE- MUSE: 87 BPM

## 2023-08-09 PROCEDURE — 82962 GLUCOSE BLOOD TEST: CPT

## 2023-08-09 PROCEDURE — 96376 TX/PRO/DX INJ SAME DRUG ADON: CPT

## 2023-08-09 PROCEDURE — 250N000013 HC RX MED GY IP 250 OP 250 PS 637: Performed by: HOSPITALIST

## 2023-08-09 PROCEDURE — 99233 SBSQ HOSP IP/OBS HIGH 50: CPT | Performed by: HOSPITALIST

## 2023-08-09 PROCEDURE — 120N000001 HC R&B MED SURG/OB

## 2023-08-09 PROCEDURE — G0378 HOSPITAL OBSERVATION PER HR: HCPCS

## 2023-08-09 PROCEDURE — 95816 EEG AWAKE AND DROWSY: CPT

## 2023-08-09 PROCEDURE — 258N000003 HC RX IP 258 OP 636: Performed by: HOSPITALIST

## 2023-08-09 PROCEDURE — 250N000011 HC RX IP 250 OP 636: Mod: JZ | Performed by: PSYCHIATRY & NEUROLOGY

## 2023-08-09 RX ORDER — METOPROLOL TARTRATE 50 MG
50 TABLET ORAL 2 TIMES DAILY
Status: DISCONTINUED | OUTPATIENT
Start: 2023-08-09 | End: 2023-08-11 | Stop reason: HOSPADM

## 2023-08-09 RX ORDER — LEVETIRACETAM 500 MG/1
1000 TABLET ORAL 2 TIMES DAILY
Status: DISCONTINUED | OUTPATIENT
Start: 2023-08-09 | End: 2023-08-11 | Stop reason: HOSPADM

## 2023-08-09 RX ADMIN — SODIUM CHLORIDE: 9 INJECTION, SOLUTION INTRAVENOUS at 03:34

## 2023-08-09 RX ADMIN — ACETAMINOPHEN 650 MG: 325 TABLET, FILM COATED ORAL at 19:59

## 2023-08-09 RX ADMIN — LEVETIRACETAM 1000 MG: 500 TABLET, FILM COATED ORAL at 19:59

## 2023-08-09 RX ADMIN — SODIUM CHLORIDE: 9 INJECTION, SOLUTION INTRAVENOUS at 16:17

## 2023-08-09 RX ADMIN — LEVETIRACETAM 1000 MG: 10 INJECTION INTRAVENOUS at 06:06

## 2023-08-09 RX ADMIN — METOPROLOL TARTRATE 50 MG: 50 TABLET, FILM COATED ORAL at 20:00

## 2023-08-09 RX ADMIN — METOPROLOL TARTRATE 50 MG: 50 TABLET, FILM COATED ORAL at 10:20

## 2023-08-09 ASSESSMENT — ACTIVITIES OF DAILY LIVING (ADL)
ADLS_ACUITY_SCORE: 35
ADLS_ACUITY_SCORE: 34

## 2023-08-09 NOTE — PROGRESS NOTES
ASSESSMENT/PLAN:   68-year-old with history of subarachnoid hemorrhage, ischemic infarcts 2019 presenting with stereotypical spells of loss of consciousness for several hours with no aura.  She had a witnessed spell of tonic-clonic seizure activity in ED lasting less than 1 minute. Stereotypical spells suggestive of underlying seizure disorder     Keppra 1000 mg BID  Repeat EEG today   No driving  Follow-up with primary neurologist  Call us with any questions    24 HOUR EVENTS:                   Current spells doing well on current dose of Keppra residual being tired MR brain no acute stroke  EXAMINATION:   Past medical history, family history, social history and review of systems are unchanged except as noted below.    VITAL SIGNS:   /62   Pulse 74   Temp 98.4  F (36.9  C) (Oral)   Resp 16   Wt 79.4 kg (175 lb)   SpO2 95%       On general examination the patient was in no acute distress. No labored breathing. Mood and affect was normal  No clubbing,cyanosis, pedal edema in extremities     Neurologic:  Mental status: alert and oriented  to the current situations  fund of knowledge intact.  Intact attention Speech and Language: Comprehension and spontaneous speech are normal. No dysarthria. Visual fields were full, Face -symmetric Eye closure- intact. Tongue movements- normal ; Motor strength- moves all four limbs equally ; No tremors or involuntary movements    PERTINENT DATA:  Lab and X-ray: Recent Labs   Lab Test 08/08/23  0650   WBC 9.5   HGB 12.8      POTASSIUM 3.8     [unfilled]  Recent Labs   Lab Test 08/08/23  0650   POTASSIUM 3.8   CHLORIDE 104   BUN 17.1     Recent Labs   Lab Test 08/08/23  0650   WBC 9.5   HGB 12.8   MCV 89        Recent Labs   Lab Test 08/08/23  0650   AST 27   ALT 27   ALKPHOS 78     No lab results found.    Invalid input(s): CHOLESTEROL, TRIGLYCERIDES  No lab results found.    Laboratory results were personally interpreted and reviewed in  detail.    Imaging studies reviewed and interpreted in detail    Summary: List Problems:   Patient Active Problem List   Diagnosis     Seizures (H)

## 2023-08-09 NOTE — PROGRESS NOTES
[facility]    Hospitalist Progress Note    Date of Admission:  8/8/2023    Assessment & Plan     Nasrin Singer is a 68 year old female with a history of HTN, prior cryptogenic SAH, JABARI, HLD who presents after being found down on the bathroom floor in the airport and had witnessed seizure episode in the ED.     Seizure, new diagnosis  Postictal encephalopathy  Patient was traveling back home from [place] to [place] with a layover in [place].  She remembers going into the bathroom in the airport around 4:30 AM.  She thinks she must have passed out in the bathroom and may have been down for about 30 minutes when she was found by a bystander.  EMS found her alert but not oriented to situation.  In the ED, she was noted to have stable vitals, .  EKG with no acute findings.  Head CT with no acute findings.  Labs including troponin T, Ethyl alcohol completely unremarkable.  Patient had a witnessed generalized tonic-clonic seizure episode in the ED lasting about 30 to 60 seconds which terminated spontaneously.  Patient was given 0.5 Mg IV Ativan, also loaded with 1500 Mg IV Keppra and is admitted under observation status for further management.  She had an unexplained SAH in 2019.  She has also had 2 episodes of loss of awareness, first on 8/2020, second on 4/2023.  She has undergone cardiac and neurological evaluation for these.  Per last neurology note dated 6/21/2023: She recently obtained an EEG which was without epileptic activity but notable left frontal intermittent rhythmic delta activity, intermittent generalized slowing. She also underwent 30 day cardiac monitor and TTE recently with no acute findings to explain these episodes.    -Consult to neurology.  EEG done that was abnormal with frequent cortical irritability in the left frontotemporal regions.  Patient was given additional IV Keppra 1000Mg evening of 8/8 and started on maintenance Keppra 1000 mg twice  daily.  -Neurochecks  -Seizure precautions, as needed IV Ativan available  -MRI brain did not show acute infarct, showed right inferior cerebellum small chronic infarct.  Incidental right parotid gland 6 mm nodule/cyst noted.    -Repeat EEG on 8/9 continue to show focal epileptogenic activity at left temporal region.  Neurology recommended monitoring for 24 hours, can be discharged after that if no clinical spells.  Continue on Keppra 1000 mg twice daily.     H/o Cryptogenic SAH  Patient was admitted on 9/28/19 for nontraumatic subarachnoid hemorrhage. She presented to the hospital after sudden onset 10/10 headache. In th ED was diagnosed with SAH. Patient had two catheter angiograms and brain/cervical spine MRI and all unrevealing for vascular anomaly but first dsa complicated by left posterior cerebral artery strokes and right frontal subcortical stroke s/p with Integrilin; clinically silent.   -Noted     JABARI  -Does not appear to be on treatment     HTN  -Continue PTA metoprolol.  Holding PTA losartan and amlodipine.     HLD  -Continue PTA lovastatin     DVT Prophylaxis: Low Risk/Ambulatory with no VTE prophylaxis indicated  Code Status: Full Code         Medical Decision Making       Please see A&P for additional details of medical decision making.        Clinically Significant Risk Factors Present on Admission                  # Hypertension: Home medication list includes antihypertensive(s)                 Martine Donaldson MD  Text Page (7am - 6pm, M-F)  [facility]  Securely message with the Vocera Web Console (learn more here)  Text page via [employer] Paging/Directory      Interval History   Stable overnight with no further seizure activity.  Denies any headache.  Cousin at bedside.  No focal numbness or weakness.    -Data reviewed today: I reviewed all new labs and imaging results over the last 24 hours. I personally reviewed MRI scan with result as noted above    Physical Exam   Temp:  98.6  F (37  C) Temp src: Oral BP: 115/68 Pulse: 67   Resp: 18 SpO2: 94 % O2 Device: None (Room air) Oxygen Delivery: 2 LPM  Vitals:    08/08/23 0644   Weight: 79.4 kg (175 lb)     Vital Signs with Ranges  Temp:  [98.1  F (36.7  C)-98.7  F (37.1  C)] 98.6  F (37  C)  Pulse:  [62-76] 67  Resp:  [16-18] 18  BP: (109-139)/(62-84) 115/68  SpO2:  [94 %-99 %] 94 %  I/O last 3 completed shifts:  In: 1000 [I.V.:1000]  Out: -     Constitutional: Alert, appears comfortable, in no acute distress  Respiratory: Non labored breathing, clear to auscultation bilaterally, no crackles or wheezes  Cardiovascular: Heart sounds regular rate and rhythm, no murmurs, no leg edema  GI: Abdomen is soft, non distended, non tender. Normal BS  Skin/Integumen: no rashes, no pressure sores  Neuro: alert, converses appropriately, moving all extremities, fluent speech, no facial asymmetry  Psych: mood and affect appropriate      Medications    sodium chloride 100 mL/hr at 08/09/23 1617      levETIRAcetam  1,000 mg Oral BID    metoprolol tartrate  50 mg Oral BID       Data   Recent Labs   Lab 08/09/23  0155 08/08/23  2133 08/08/23  0748 08/08/23  0650   WBC  --   --   --  9.5   HGB  --   --   --  12.8   MCV  --   --   --  89   PLT  --   --   --  213   NA  --   --   --  142   POTASSIUM  --   --   --  3.8   CHLORIDE  --   --   --  104   CO2  --   --   --  24   BUN  --   --   --  17.1   CR  --   --   --  0.65   ANIONGAP  --   --   --  14   NORBERTO  --   --   --  9.3   GLC 97 131* 134* 128*   ALBUMIN  --   --   --  4.5   PROTTOTAL  --   --   --  7.0   BILITOTAL  --   --   --  0.2   ALKPHOS  --   --   --  78   ALT  --   --   --  27   AST  --   --   --  27       Imaging  Recent Results (from the past 24 hour(s))   MR Brain w/o & w Contrast    Narrative    EXAM: MR BRAIN W/O and W CONTRAST  LOCATION: [facility]  DATE: 8/8/2023    INDICATION: new onset seizure  COMPARISON: CT head 08/08/2023  CONTRAST: 8mL Gadavist  TECHNIQUE: Routine  multiplanar multisequence head MRI without and with intravenous contrast.    FINDINGS:  Seizure protocol not performed. No coronal T2 performed. No coronal FLAIR performed. No thin section T1 sequence performed.    INTRACRANIAL CONTENTS: No acute or subacute infarct. No mass, acute hemorrhage, or extra-axial fluid collections. Patchy nonspecific T2/FLAIR hyperintensities within the cerebral white matter most consistent with mild to moderate chronic microvascular   ischemic change. Normal ventricles and sulci. Normal position of the cerebellar tonsils. No pathologic contrast enhancement.    Right inferior cerebellum small chronic infarct.    SELLA: No abnormality accounting for technique.    OSSEOUS STRUCTURES/SOFT TISSUES: Right posterior scalp soft tissue swelling. Normal marrow signal. The major intracranial vascular flow voids are maintained. Right parotid gland 6 mm indeterminate nodule or cyst.    ORBITS: No abnormality accounting for technique.     SINUSES/MASTOIDS: Mild to moderate mucosal thickening scattered about the paranasal sinuses. No middle ear or mastoid effusion.       Impression    IMPRESSION:  1.  No acute infarct.  2.  Right inferior cerebellum small chronic infarct.   3.  Age-related changes described above.  4.  Right parotid gland 6 mm indeterminate nodule or cyst. Recommend nonemergent ENT referral.

## 2023-08-09 NOTE — PROGRESS NOTES
PRIMARY DIAGNOSIS: SYNCOPE  OUTPATIENT/OBSERVATION GOALS TO BE MET BEFORE DISCHARGE:  1. Diagnostic testing complete & at baseline neurologic testing: No    2. Cleared by consultants (if involved): No      3. Tolerating adequate PO diet and medications: Yes    4. Return to near baseline physical activity or neurologic status: Yes    Discharge Planner Nurse   Safe discharge environment identified: No  Barriers to discharge: No       Entered by: Laureano Riley RN 08/09/2023 1:49 AM     Please review provider order for any additional goals.   Nurse to notify provider when observation goals have been met and patient is ready for discharge.

## 2023-08-09 NOTE — PROGRESS NOTES
/68 (BP Location: Left arm)   Pulse 67   Temp 98.6  F (37  C) (Oral)   Resp 18   Wt 79.4 kg (175 lb)   SpO2 94%  .    Assessment: Patient is alert and oriented x4, no neuro deficits. Vital signs stable on room air. Continent of bowel and bladder. Patient up with standby assist, denies dizziness. Tolerating regular diet. EEG completed this shift, plan to stay overnight and reassess tomorrow morning. No seizures noted.     Pain management: denies.     Na Mascorro RN on 8/9/2023 at 6:43 PM

## 2023-08-09 NOTE — PROGRESS NOTES
Neurology     EEG - continued to show focal epileptogenic activity at Left temporal region     No clinical/electrographic  seizures     Continue current Keppra 1000 mg BID   Monitor for 24 hrs - if no clinical spells pt can be discharge from neurology stand point    Thank you     Daniela Fields MD  [facility] Neurology  Office Phone [phone]

## 2023-08-09 NOTE — PROGRESS NOTES
PRIMARY DIAGNOSIS: SYNCOPE  OUTPATIENT/OBSERVATION GOALS TO BE MET BEFORE DISCHARGE:  1. Diagnostic testing complete & at baseline neurologic testing: No    2. Cleared by consultants (if involved): No    3. Tolerating adequate PO diet and medications: Yes    4. Return to near baseline physical activity or neurologic status: Yes    Discharge Planner Nurse   Safe discharge environment identified: No  Barriers to discharge: No       Entered by: Laureano Riley RN 08/09/2023 4:29 AM     Please review provider order for any additional goals.   Nurse to notify provider when observation goals have been met and patient is ready for discharge.

## 2023-08-09 NOTE — UTILIZATION REVIEW
[facility] Utilization Review  Admission Status; Secondary Review Determination     Admission Date: 8/8/2023  6:34 AM      Under the authority of the Utilization Management Committee, the utilization review process indicated a secondary review on the above patient.  The review outcome is based on review of the medical records, discussions with staff, and applying clinical experience noted on the date of the review.        (X)      Inpatient Status Appropriate - This patient's medical care is consistent with medical management for inpatient care and reasonable inpatient medical practice.          RATIONALE FOR DETERMINATION   68-year-old female with history of hypertension, prior cryptogenic SAH, JABARI, hyperlipidemia, admitted [place] with witnessed seizure in the ED.  Patient has postictal encephalopathy, received IV Ativan, Keppra load, EEG showed no epileptic activity but notable left frontal intermittent rhythmic delta activity, TTE unremarkable.  Patient is on neurochecks, seizure precautions and IV Ativan as needed, MRI brain with IV fluid hydration.  EEG continues to show focal epileptogenic activity at left temporal region, continued on Keppra.  Complex patient who needs close monitoring in the hospital with ongoing interventions including EEG, adjustment in Keppra dose and further workup, recommend change to inpatient status.  Communicated to Dr. Donaldson      The severity of illness, intensity of service provided, expected LOS and risk for adverse outcome make the care complex, high risk and appropriate for hospital admission.The patient requires hospital based medical care which is anticipated to require a stay of 2 or more midnights.        The information on this document is developed by the utilization review team in order for the business office to ensure compliance.  This only denotes the appropriateness of proper admission status and does  not reflect the quality of care rendered.              Sincerely,       Beryl Beth MD  Physician Advisor  Utilization Review-[place]    Phone: [phone]

## 2023-08-09 NOTE — PROGRESS NOTES
RECEIVING UNIT ED HANDOFF REVIEW    ED Nurse Handoff Report was reviewed by: Laureano Riley RN on August 8, 2023 at 7:57 PM

## 2023-08-09 NOTE — PLAN OF CARE
Pt here with Seizures. A&Ox4. Neuros intact. VSS. Regular diet, thin liquids. Takes pills whole. Up with SBA. Denies pain. Pt scoring green on the Aggression Stop Light Tool. No seizure activity noted overnight. IVF running @ 100 mL/hr. Discharge pending.

## 2023-08-09 NOTE — PROGRESS NOTES
Observation Goals:   -diagnostic tests and consults completed and resulted: EEG to be complete  -vital signs normal or at patient baseline: met

## 2023-08-09 NOTE — PROGRESS NOTES
Observation Goals:   -diagnostic tests and consults completed and resulted: EEG to be scheduled today.   -vital signs normal or at patient baseline: Met

## 2023-08-10 ENCOUNTER — APPOINTMENT (OUTPATIENT)
Dept: GENERAL RADIOLOGY | Facility: CLINIC | Age: 68
DRG: 100 | End: 2023-08-10
Attending: HOSPITALIST
Payer: COMMERCIAL

## 2023-08-10 LAB
ALBUMIN SERPL BCG-MCNC: 3.9 G/DL (ref 3.5–5.2)
ALBUMIN UR-MCNC: NEGATIVE MG/DL
ALP SERPL-CCNC: 67 U/L (ref 35–104)
ALT SERPL W P-5'-P-CCNC: 27 U/L (ref 0–50)
ANION GAP SERPL CALCULATED.3IONS-SCNC: 8 MMOL/L (ref 7–15)
APPEARANCE UR: CLEAR
AST SERPL W P-5'-P-CCNC: 45 U/L (ref 0–45)
BASOPHILS # BLD AUTO: 0 10E3/UL (ref 0–0.2)
BASOPHILS NFR BLD AUTO: 0 %
BILIRUB SERPL-MCNC: 0.4 MG/DL
BILIRUB UR QL STRIP: NEGATIVE
BUN SERPL-MCNC: 7.7 MG/DL (ref 8–23)
CALCIUM SERPL-MCNC: 8.7 MG/DL (ref 8.8–10.2)
CHLORIDE SERPL-SCNC: 103 MMOL/L (ref 98–107)
COLOR UR AUTO: ABNORMAL
CREAT SERPL-MCNC: 0.69 MG/DL (ref 0.51–0.95)
CRP SERPL-MCNC: 10.59 MG/L
DEPRECATED HCO3 PLAS-SCNC: 27 MMOL/L (ref 22–29)
EOSINOPHIL # BLD AUTO: 0.1 10E3/UL (ref 0–0.7)
EOSINOPHIL NFR BLD AUTO: 1 %
ERYTHROCYTE [DISTWIDTH] IN BLOOD BY AUTOMATED COUNT: 12.8 % (ref 10–15)
GFR SERPL CREATININE-BSD FRML MDRD: >90 ML/MIN/1.73M2
GLUCOSE SERPL-MCNC: 104 MG/DL (ref 70–99)
GLUCOSE UR STRIP-MCNC: NEGATIVE MG/DL
HCT VFR BLD AUTO: 37.7 % (ref 35–47)
HGB BLD-MCNC: 12 G/DL (ref 11.7–15.7)
HGB UR QL STRIP: ABNORMAL
IMM GRANULOCYTES # BLD: 0 10E3/UL
IMM GRANULOCYTES NFR BLD: 1 %
KETONES UR STRIP-MCNC: NEGATIVE MG/DL
LEUKOCYTE ESTERASE UR QL STRIP: NEGATIVE
LYMPHOCYTES # BLD AUTO: 0.5 10E3/UL (ref 0.8–5.3)
LYMPHOCYTES NFR BLD AUTO: 9 %
MCH RBC QN AUTO: 28.5 PG (ref 26.5–33)
MCHC RBC AUTO-ENTMCNC: 31.8 G/DL (ref 31.5–36.5)
MCV RBC AUTO: 90 FL (ref 78–100)
MONOCYTES # BLD AUTO: 0.5 10E3/UL (ref 0–1.3)
MONOCYTES NFR BLD AUTO: 9 %
MUCOUS THREADS #/AREA URNS LPF: PRESENT /LPF
NEUTROPHILS # BLD AUTO: 4.6 10E3/UL (ref 1.6–8.3)
NEUTROPHILS NFR BLD AUTO: 80 %
NITRATE UR QL: NEGATIVE
NRBC # BLD AUTO: 0 10E3/UL
NRBC BLD AUTO-RTO: 0 /100
PH UR STRIP: 5 [PH] (ref 5–7)
PLATELET # BLD AUTO: 177 10E3/UL (ref 150–450)
POTASSIUM SERPL-SCNC: 3.8 MMOL/L (ref 3.4–5.3)
PROCALCITONIN SERPL IA-MCNC: 0.04 NG/ML
PROT SERPL-MCNC: 6.3 G/DL (ref 6.4–8.3)
RBC # BLD AUTO: 4.21 10E6/UL (ref 3.8–5.2)
RBC URINE: <1 /HPF
SARS-COV-2 RNA RESP QL NAA+PROBE: POSITIVE
SODIUM SERPL-SCNC: 138 MMOL/L (ref 136–145)
SP GR UR STRIP: 1.01 (ref 1–1.03)
SQUAMOUS EPITHELIAL: <1 /HPF
UROBILINOGEN UR STRIP-MCNC: NORMAL MG/DL
WBC # BLD AUTO: 5.7 10E3/UL (ref 4–11)
WBC URINE: 1 /HPF

## 2023-08-10 PROCEDURE — 99233 SBSQ HOSP IP/OBS HIGH 50: CPT | Performed by: HOSPITALIST

## 2023-08-10 PROCEDURE — 250N000013 HC RX MED GY IP 250 OP 250 PS 637: Performed by: HOSPITALIST

## 2023-08-10 PROCEDURE — 120N000001 HC R&B MED SURG/OB

## 2023-08-10 PROCEDURE — 87040 BLOOD CULTURE FOR BACTERIA: CPT | Performed by: HOSPITALIST

## 2023-08-10 PROCEDURE — 36415 COLL VENOUS BLD VENIPUNCTURE: CPT | Performed by: HOSPITALIST

## 2023-08-10 PROCEDURE — 81003 URINALYSIS AUTO W/O SCOPE: CPT | Performed by: HOSPITALIST

## 2023-08-10 PROCEDURE — 87635 SARS-COV-2 COVID-19 AMP PRB: CPT | Performed by: HOSPITALIST

## 2023-08-10 PROCEDURE — 84145 PROCALCITONIN (PCT): CPT | Performed by: HOSPITALIST

## 2023-08-10 PROCEDURE — 85025 COMPLETE CBC W/AUTO DIFF WBC: CPT | Performed by: HOSPITALIST

## 2023-08-10 PROCEDURE — 86140 C-REACTIVE PROTEIN: CPT | Performed by: HOSPITALIST

## 2023-08-10 PROCEDURE — 258N000003 HC RX IP 258 OP 636: Performed by: HOSPITALIST

## 2023-08-10 PROCEDURE — 71046 X-RAY EXAM CHEST 2 VIEWS: CPT

## 2023-08-10 PROCEDURE — 250N000011 HC RX IP 250 OP 636: Mod: JZ | Performed by: HOSPITALIST

## 2023-08-10 PROCEDURE — 80053 COMPREHEN METABOLIC PANEL: CPT | Performed by: HOSPITALIST

## 2023-08-10 RX ORDER — ENOXAPARIN SODIUM 100 MG/ML
40 INJECTION SUBCUTANEOUS EVERY 24 HOURS
Status: DISCONTINUED | OUTPATIENT
Start: 2023-08-10 | End: 2023-08-11 | Stop reason: HOSPADM

## 2023-08-10 RX ADMIN — ENOXAPARIN SODIUM 40 MG: 40 INJECTION SUBCUTANEOUS at 17:25

## 2023-08-10 RX ADMIN — METOPROLOL TARTRATE 50 MG: 50 TABLET, FILM COATED ORAL at 08:18

## 2023-08-10 RX ADMIN — LEVETIRACETAM 1000 MG: 500 TABLET, FILM COATED ORAL at 08:18

## 2023-08-10 RX ADMIN — LEVETIRACETAM 1000 MG: 500 TABLET, FILM COATED ORAL at 20:55

## 2023-08-10 RX ADMIN — SODIUM CHLORIDE: 9 INJECTION, SOLUTION INTRAVENOUS at 03:40

## 2023-08-10 RX ADMIN — METOPROLOL TARTRATE 50 MG: 50 TABLET, FILM COATED ORAL at 20:56

## 2023-08-10 RX ADMIN — ACETAMINOPHEN 650 MG: 325 TABLET, FILM COATED ORAL at 20:55

## 2023-08-10 RX ADMIN — ACETAMINOPHEN 650 MG: 325 TABLET, FILM COATED ORAL at 08:18

## 2023-08-10 ASSESSMENT — ACTIVITIES OF DAILY LIVING (ADL)
ADLS_ACUITY_SCORE: 35
ADLS_ACUITY_SCORE: 34

## 2023-08-10 NOTE — PLAN OF CARE
Pt here with seizures. A&O x4. Neuros with intermittent numbness to BLE. VSS, on 2L oxygen while sleeping. Regular diet, thin liquids. Takes pills whole. Up with SBA. Denies pain. Pt scoring green on the Aggression Stop Light Tool. Plan for possible discharge home today.

## 2023-08-10 NOTE — PROGRESS NOTES
[facility]    Hospitalist Progress Note    Date of Admission:  8/8/2023    Assessment & Plan     Nasrin Singer is a 68 year old female with a history of HTN, prior cryptogenic SAH, JABARI, HLD who presents after being [place] the airport and had witnessed seizure episode in the ED.     Seizure, new diagnosis  Postictal encephalopathy  Patient was traveling back home from [place] to [place] with a layover in [place].  She remembers going into the bathroom in the airport around 4:30 AM.  She thinks she must have passed out in the bathroom and may have been down for about 30 minutes when she was found by a bystander.  EMS found her alert but not oriented to situation.  In the ED, she was noted to have stable vitals, .  EKG with no acute findings.  Head CT with no acute findings.  Labs including troponin T, Ethyl alcohol completely unremarkable.  Patient had a witnessed generalized tonic-clonic seizure episode in the ED lasting about 30 to 60 seconds which terminated spontaneously.  Patient was given 0.5 Mg IV Ativan, also loaded with 1500 Mg IV Keppra and is admitted under observation status for further management.  She had an unexplained SAH in 2019.  She has also had 2 episodes of loss of awareness, first on 8/2020, second on 4/2023.  She has undergone cardiac and neurological evaluation for these.  Per last neurology note dated 6/21/2023: She recently obtained an EEG which was without epileptic activity but notable left frontal intermittent rhythmic delta activity, intermittent generalized slowing. She also underwent 30 day cardiac monitor and TTE recently with no acute findings to explain these episodes.    -Consult to neurology.  EEG done that was abnormal with frequent cortical irritability in the left frontotemporal regions.  Patient was given additional IV Keppra 1000Mg evening of 8/8 and started on maintenance Keppra 1000 mg twice  daily.  -Neurochecks  -Seizure precautions, as needed IV Ativan available  -MRI brain did not show acute infarct, showed right inferior cerebellum small chronic infarct.  Incidental right parotid gland 6 mm nodule/cyst noted.    -Repeat EEG on 8/9 continue to show focal epileptogenic activity at left temporal region.  Neurology recommended monitoring for 24 hours, can be discharged after that if no clinical spells.  Continue on Keppra 1000 mg twice daily.     COVID-19 infection  Patient spiked fever of 101.9 morning of 8/10.  Also complained of new loose cough.  She tested positive for COVID-19 on 8/10.  Denied any chest pain, shortness of breath, urinary symptoms.  CRP 10.5, other labs unremarkable.  UA with no signs of infection.  CXR showed  left basilar patchy opacity could be related to developing infection.  Otherwise stable lungs.  -Start on Paxlovid course.  Discussed with patient.  -Subcutaneous Lovenox for DVT prophylaxis    H/o Cryptogenic SAH  Patient was admitted on 9/28/19 for nontraumatic subarachnoid hemorrhage. She presented to the hospital after sudden onset 10/10 headache. In th ED was diagnosed with SAH. Patient had two catheter angiograms and brain/cervical spine MRI and all unrevealing for vascular anomaly but first dsa complicated by left posterior cerebral artery strokes and right frontal subcortical stroke s/p with Integrilin; clinically silent.   -Noted     JABARI  -Does not appear to be on treatment     HTN  -Continue PTA metoprolol.  Holding PTA losartan and amlodipine.     HLD  -Continue PTA lovastatin     DVT Prophylaxis: Low Risk/Ambulatory with no VTE prophylaxis indicated  Code Status: Full Code         Medical Decision Making       Please see A&P for additional details of medical decision making.        Clinically Significant Risk Factors                                      Martine Donaldson MD  Text Page (7am - 6pm, M-F)  [facility]  Securely message with the  [employer] Web Console (learn more here)  Text page via [employer] Paging/Directory      Interval History   Fever this morning to 101.9.  Tested positive for COVID.  Has developed a loose cough since yesterday.  No chest pain, shortness of breath, abdominal pain, nausea, vomiting, diarrhea, urinary symptoms.  Agrees to Paxlovid.  No seizure activity overnight.    -Data reviewed today: I reviewed all new labs and imaging results over the last 24 hours. I personally reviewed MRI scan with result as noted above    Physical Exam   Temp: 99.5  F (37.5  C) Temp src: Oral BP: 108/61 Pulse: 64   Resp: 16 SpO2: 95 % O2 Device: None (Room air)    Vitals:    08/08/23 0644   Weight: 79.4 kg (175 lb)     Vital Signs with Ranges  Temp:  [98.4  F (36.9  C)-101.9  F (38.8  C)] 99.5  F (37.5  C)  Pulse:  [64-81] 64  Resp:  [16-18] 16  BP: (108-142)/(53-75) 108/61  SpO2:  [93 %-95 %] 95 %  No intake/output data recorded.    Constitutional: Alert, appears comfortable, in no acute distress  Respiratory: Non labored breathing, faint crackles at bases  Cardiovascular: Heart sounds regular rate and rhythm, no murmurs, no leg edema  GI: Abdomen is soft, non distended, non tender. Normal BS  Skin/Integumen: no rashes, no pressure sores  Neuro: alert, converses appropriately, moving all extremities, fluent speech, no facial asymmetry  Psych: mood and affect appropriate      Medications        levETIRAcetam  1,000 mg Oral BID    metoprolol tartrate  50 mg Oral BID    nirmatrelvir and ritonavir  3 tablet Oral BID       Data   Recent Labs   Lab 08/10/23  1110 08/09/23  0155 08/08/23  2133 08/08/23  0748 08/08/23  0650   WBC 5.7  --   --   --  9.5   HGB 12.0  --   --   --  12.8   MCV 90  --   --   --  89     --   --   --  213     --   --   --  142   POTASSIUM 3.8  --   --   --  3.8   CHLORIDE 103  --   --   --  104   CO2 27  --   --   --  24   BUN 7.7*  --   --   --  17.1   CR 0.69  --   --   --  0.65   ANIONGAP 8  --   --   --  14   NORBERTO  8.7*  --   --   --  9.3   * 97 131*   < > 128*   ALBUMIN 3.9  --   --   --  4.5   PROTTOTAL 6.3*  --   --   --  7.0   BILITOTAL 0.4  --   --   --  0.2   ALKPHOS 67  --   --   --  78   ALT 27  --   --   --  27   AST 45  --   --   --  27    < > = values in this interval not displayed.         Imaging  Recent Results (from the past 24 hour(s))   XR Chest 2 Views    Narrative    CHEST TWO VIEWS 8/10/2023 9:46 AM     HISTORY: Fever    COMPARISON: Chest radiograph 8/8/2023.       Impression    IMPRESSION: More conspicuous left basilar patchy opacity could relate  to atelectasis, although developing infection is possible. Otherwise  stable lungs. No pneumothorax. Surgical clips over the right apex.  Cardiomediastinal silhouette is unremarkable.     URIEL SCHULZ MD         SYSTEM ID:  [id number]

## 2023-08-10 NOTE — PLAN OF CARE
[id number]  Pt here with Seizures. A&Ox4. Neuros intact. VSS. Regular diet, thin liquids. Takes pills whole. Up with SBA. Denies pain. Pt scoring green on the Aggression Stop Light Tool. IVF @100 mL/hr. Discharge pending re-assessment.

## 2023-08-10 NOTE — PLAN OF CARE
Goal Outcome Evaluation:       Pt here with seizures. A&O x4. Neuros intact with intermittent BLE numbness (baseline). VSS, has been using 2L/m O2 when sleeping at HS. Not on tele. reg diet, thin liquids. Takes pills whole with water. Up with SBA. Denies pain. Pt scoring green on the Aggression Stop Light Tool.  Fever episode of 101.9 and non-productive cough. Pt tested positive for Covid today. Plan to start Paxlovid course today. Discharge pending.

## 2023-08-11 VITALS
DIASTOLIC BLOOD PRESSURE: 72 MMHG | OXYGEN SATURATION: 95 % | HEART RATE: 61 BPM | TEMPERATURE: 99.3 F | WEIGHT: 175 LBS | HEIGHT: 64 IN | BODY MASS INDEX: 29.88 KG/M2 | SYSTOLIC BLOOD PRESSURE: 126 MMHG | RESPIRATION RATE: 20 BRPM

## 2023-08-11 LAB
ANION GAP SERPL CALCULATED.3IONS-SCNC: 9 MMOL/L (ref 7–15)
BUN SERPL-MCNC: 8.9 MG/DL (ref 8–23)
CALCIUM SERPL-MCNC: 8.6 MG/DL (ref 8.8–10.2)
CHLORIDE SERPL-SCNC: 104 MMOL/L (ref 98–107)
CREAT SERPL-MCNC: 0.75 MG/DL (ref 0.51–0.95)
CRP SERPL-MCNC: 9.35 MG/L
D DIMER PPP FEU-MCNC: 0.75 UG/ML FEU (ref 0–0.5)
DEPRECATED HCO3 PLAS-SCNC: 28 MMOL/L (ref 22–29)
ERYTHROCYTE [DISTWIDTH] IN BLOOD BY AUTOMATED COUNT: 12.8 % (ref 10–15)
GFR SERPL CREATININE-BSD FRML MDRD: 86 ML/MIN/1.73M2
GLUCOSE SERPL-MCNC: 106 MG/DL (ref 70–99)
HCT VFR BLD AUTO: 36.9 % (ref 35–47)
HGB BLD-MCNC: 11.8 G/DL (ref 11.7–15.7)
MCH RBC QN AUTO: 28.4 PG (ref 26.5–33)
MCHC RBC AUTO-ENTMCNC: 32 G/DL (ref 31.5–36.5)
MCV RBC AUTO: 89 FL (ref 78–100)
PLATELET # BLD AUTO: 152 10E3/UL (ref 150–450)
POTASSIUM SERPL-SCNC: 4 MMOL/L (ref 3.4–5.3)
RBC # BLD AUTO: 4.16 10E6/UL (ref 3.8–5.2)
SODIUM SERPL-SCNC: 141 MMOL/L (ref 136–145)
WBC # BLD AUTO: 5.8 10E3/UL (ref 4–11)

## 2023-08-11 PROCEDURE — 86140 C-REACTIVE PROTEIN: CPT | Performed by: HOSPITALIST

## 2023-08-11 PROCEDURE — 99239 HOSP IP/OBS DSCHRG MGMT >30: CPT | Performed by: HOSPITALIST

## 2023-08-11 PROCEDURE — 36415 COLL VENOUS BLD VENIPUNCTURE: CPT | Performed by: HOSPITALIST

## 2023-08-11 PROCEDURE — 85027 COMPLETE CBC AUTOMATED: CPT | Performed by: HOSPITALIST

## 2023-08-11 PROCEDURE — 85379 FIBRIN DEGRADATION QUANT: CPT | Performed by: HOSPITALIST

## 2023-08-11 PROCEDURE — 250N000013 HC RX MED GY IP 250 OP 250 PS 637: Performed by: HOSPITALIST

## 2023-08-11 PROCEDURE — 80048 BASIC METABOLIC PNL TOTAL CA: CPT | Performed by: HOSPITALIST

## 2023-08-11 RX ORDER — LEVETIRACETAM 1000 MG/1
1000 TABLET ORAL 2 TIMES DAILY
Qty: 60 TABLET | Refills: 0 | Status: SHIPPED | OUTPATIENT
Start: 2023-08-11

## 2023-08-11 RX ORDER — GUAIFENESIN/DEXTROMETHORPHAN 100-10MG/5
10 SYRUP ORAL 4 TIMES DAILY PRN
Qty: 236 ML | Refills: 0 | Status: SHIPPED | OUTPATIENT
Start: 2023-08-11

## 2023-08-11 RX ORDER — METOPROLOL TARTRATE 100 MG
50 TABLET ORAL 2 TIMES DAILY
Qty: 14 TABLET | Refills: 0 | Status: SHIPPED | OUTPATIENT
Start: 2023-08-11

## 2023-08-11 RX ORDER — AMLODIPINE BESYLATE 2.5 MG/1
5 TABLET ORAL DAILY
Qty: 14 TABLET | Refills: 0 | Status: SHIPPED | OUTPATIENT
Start: 2023-08-11

## 2023-08-11 RX ORDER — LOVASTATIN 40 MG
40 TABLET ORAL AT BEDTIME
Start: 2023-08-11

## 2023-08-11 RX ORDER — LOSARTAN POTASSIUM 25 MG/1
25 TABLET ORAL 2 TIMES DAILY
Start: 2023-08-11

## 2023-08-11 RX ORDER — ACETAMINOPHEN 325 MG/1
650 TABLET ORAL EVERY 6 HOURS PRN
Qty: 30 TABLET | Refills: 0 | Status: SHIPPED | OUTPATIENT
Start: 2023-08-11

## 2023-08-11 RX ADMIN — LEVETIRACETAM 1000 MG: 500 TABLET, FILM COATED ORAL at 08:10

## 2023-08-11 RX ADMIN — METOPROLOL TARTRATE 50 MG: 50 TABLET, FILM COATED ORAL at 08:10

## 2023-08-11 ASSESSMENT — ACTIVITIES OF DAILY LIVING (ADL)
FALL_HISTORY_WITHIN_LAST_SIX_MONTHS: YES
CONCENTRATING,_REMEMBERING_OR_MAKING_DECISIONS_DIFFICULTY: NO
CHANGE_IN_FUNCTIONAL_STATUS_SINCE_ONSET_OF_CURRENT_ILLNESS/INJURY: NO
ADLS_ACUITY_SCORE: 23
NUMBER_OF_TIMES_PATIENT_HAS_FALLEN_WITHIN_LAST_SIX_MONTHS: 2
ADLS_ACUITY_SCORE: 34
WALKING_OR_CLIMBING_STAIRS_DIFFICULTY: NO
DRESSING/BATHING_DIFFICULTY: NO
TOILETING_ISSUES: NO
ADLS_ACUITY_SCORE: 23
VISION_MANAGEMENT: GLASSES
ADLS_ACUITY_SCORE: 23
ADLS_ACUITY_SCORE: 23
EQUIPMENT_CURRENTLY_USED_AT_HOME: GRAB BAR, TUB/SHOWER;GRAB BAR, TOILET
ADLS_ACUITY_SCORE: 34
ADLS_ACUITY_SCORE: 34
DIFFICULTY_EATING/SWALLOWING: NO
ADLS_ACUITY_SCORE: 34
DOING_ERRANDS_INDEPENDENTLY_DIFFICULTY: NO
WEAR_GLASSES_OR_BLIND: YES

## 2023-08-11 NOTE — PLAN OF CARE
Patient discharged. Discontinued IV. Discontinued telemetry. Discharge paperwork reviewed, patient indicates understanding of all follow up instructions and appointments. All questions answered. Patient received new prescriptions from [facility] pharmacy and indicates understanding of medication schedule. Pt did receive her Paxlovid dose as specified per hospitalist. Patient states they have all belongings. Pt family on their way to drive her home. Pt will put on call light when they are here to have wheelchair transport take her down to entrance.

## 2023-08-11 NOTE — PLAN OF CARE
Pt here with Seizures, covid+ 8/10. A&Ox4. Neuros intact. VSS. Regular diet, thin liquids. Takes pills whole. Up with SBA. Denies pain. Pt scoring green on the Aggression Stop Light Tool. Fever broke at 2300. Requested to sleep after initial assessment. Discharge pending medical clearance.

## 2023-08-11 NOTE — DISCHARGE SUMMARY
Discharge Summary  Hospitalist    Date of Admission:  8/8/2023  Date of Discharge:  8/11/2023  Discharging Provider: Martine Donaldson MD, MD  Date of Service (when I saw the patient): 08/11/23    Discharge Diagnoses   Seizure, new diagnosis  Postictal encephalopathy  COVID-19 infection    History of Present Illness   Please refer H & P for details.      Hospital Course   Nasrin Singer is a 68 year old female with a history of HTN, prior cryptogenic SAH, JABARI, HLD who presents after being found down on the bathroom floor in the airport and had witnessed seizure episode in the ED.     Seizure, new diagnosis  Postictal encephalopathy  Patient was traveling back home from [place] to [place] with a layover in [place].  She remembers going into the bathroom in the airport around 4:30 AM.  She thinks she must have passed out in the bathroom and may have been down for about 30 minutes when she was found by a bystander.  EMS found her alert but not oriented to situation.  In the ED, she was noted to have stable vitals, .  EKG with no acute findings.  Head CT with no acute findings.  Labs including troponin T, Ethyl alcohol completely unremarkable.  Patient had a witnessed generalized tonic-clonic seizure episode in the ED lasting about 30 to 60 seconds which terminated spontaneously.  Patient was given 0.5 Mg IV Ativan, also loaded with 1500 Mg IV Keppra and is admitted under observation status for further management.  She had an unexplained SAH in 2019.  She has also had 2 episodes of loss of awareness, first on 8/2020, second on 4/2023.  She has undergone cardiac and neurological evaluation for these.  Per last neurology note dated 6/21/2023: She recently obtained an EEG which was without epileptic activity but notable left frontal intermittent rhythmic delta activity, intermittent generalized slowing. She also underwent 30 day cardiac monitor and TTE recently with no acute findings to explain these  episodes.    -Consult to neurology.  EEG done that was abnormal with frequent cortical irritability in the left frontotemporal regions.  Patient was given additional IV Keppra 1000Mg evening of 8/8 and started on maintenance Keppra 1000 mg twice daily.  -Neurochecks  -Seizure precautions, as needed IV Ativan available  -MRI brain did not show acute infarct, showed right inferior cerebellum small chronic infarct.  Incidental right parotid gland 6 mm nodule/cyst noted.    -Repeat EEG on 8/9 continue to show focal epileptogenic activity at left temporal region.  Neurology recommended monitoring for 24 hours, can be discharged after that if no clinical spells.  Continue on Keppra 1000 mg twice daily.  -Patient is discharged home in stable condition on 8/11.  Will need follow-up with primary neurologist after she returns home to [place].     COVID-19 infection  Patient spiked fever of 101.9 morning of 8/10.  Also complained of new loose cough.  She tested positive for COVID-19 on 8/10.  Denied any chest pain, shortness of breath, urinary symptoms.  CRP 10.5, other labs unremarkable.  UA with no signs of infection.  CXR showed  left basilar patchy opacity could be related to developing infection.  Otherwise stable lungs.  -Started on Paxlovid course.  Discussed with patient.  -Subcutaneous Lovenox for DVT prophylaxis  -As needed Robitussin-DM  -CRP improving on 8/11  -Patient will plan to likely quarantine in her cousin's home in [place] prior to traveling back to [place].     H/o Cryptogenic SAH  Patient was admitted on 9/28/19 for nontraumatic subarachnoid hemorrhage. She presented to the hospital after sudden onset 10/10 headache. In th ED was diagnosed with SAH. Patient had two catheter angiograms and brain/cervical spine MRI and all unrevealing for vascular anomaly but first dsa complicated by left posterior cerebral artery strokes and right frontal subcortical stroke s/p with Integrilin; clinically silent.    -Noted     JABARI  -Does not appear to be on treatment     HTN  -Continue PTA metoprolol.  Holding PTA losartan and amlodipine.  -Amlodipine resumed at discharge.  Patient will resume losartan after she returns back home to [place].     HLD  -Hold PTA lovastatin while on Paxlovid.    Martine Donaldson MD, MD      Pending Results   These results will be followed up by Hospitalist team.  Unresulted Labs Ordered in the Past 30 Days of this Admission       Date and Time Order Name Status Description    8/10/2023  8:56 AM Blood Culture Hand, Right Preliminary     8/10/2023  8:56 AM Blood Culture Arm, Left Preliminary             Code Status   Full Code       Primary Care Physician   Physician No Ref-Primary    Follow-ups Needed After Discharge   Follow-up Appointments     Follow-up and recommended labs and tests       Follow up with primary care provider, Physician No Ref-Primary, within 7   days for hospital follow- up.  No follow up labs or test are needed.  Follow-up with primary neurologist at next available appointment            Physical Exam   Temp: 99.3  F (37.4  C) Temp src: Oral BP: 126/72 Pulse: 61   Resp: 20 SpO2: 95 % O2 Device: None (Room air) Oxygen Delivery: 2 LPM  Vitals:    08/08/23 0644   Weight: 79.4 kg (175 lb)     Vital Signs with Ranges  Temp:  [98.3  F (36.8  C)-102.8  F (39.3  C)] 99.3  F (37.4  C)  Pulse:  [61-79] 61  Resp:  [16-20] 20  BP: (126-143)/(63-75) 126/72  SpO2:  [91 %-97 %] 95 %  No intake/output data recorded.    Constitutional: Alert, appears comfortable, in no acute distress  Respiratory: Non labored breathing, faint crackles at bases  Cardiovascular: Heart sounds regular rate and rhythm, no murmurs, no leg edema  GI: Abdomen is soft, non distended, non tender. Normal BS  Skin/Integumen: no rashes, no pressure sores  Neuro: alert, converses appropriately, moving all extremities, fluent speech, no facial asymmetry  Psych: mood and affect appropriate            Discharge Disposition    Discharged to home  Condition at discharge: Stable    Consultations This Hospital Stay   NEUROLOGY IP CONSULT    Time Spent on this Encounter   IMartine MD, personally saw the patient today and spent greater than 30 minutes discharging this patient.    Discharge Orders      Reason for your hospital stay    New onset seizure, COVID-19 infection     Follow-up and recommended labs and tests     Follow up with primary care provider, Physician No Ref-Primary, within 7 days for hospital follow- up.  No follow up labs or test are needed.  Follow-up with primary neurologist at next available appointment     Activity    Your activity upon discharge: activity as tolerated     When to contact your care team    Call your primary doctor if you have any of the following: Worsening chest pain, cough, fever, shortness of, lethargy, confusion, seizures     Discharge Instructions    Please self quarantine at home for the next 5 days.    Do not take lovastatin till you finish your course of Paxlovid.     Diet    Follow this diet upon discharge: Orders Placed This Encounter      Regular Diet Adult     Discharge Medications   Current Discharge Medication List        START taking these medications    Details   acetaminophen (TYLENOL) 325 MG tablet Take 2 tablets (650 mg) by mouth every 6 hours as needed for mild pain or other (and adjunct with moderate or severe pain or per patient request)  Qty: 30 tablet, Refills: 0    Associated Diagnoses: Infection due to 2019 novel coronavirus      guaiFENesin-dextromethorphan (ROBITUSSIN DM) 100-10 MG/5ML syrup Take 10 mLs by mouth 4 times daily as needed for cough  Qty: 236 mL, Refills: 0    Associated Diagnoses: Infection due to 2019 novel coronavirus      levETIRAcetam (KEPPRA) 1000 MG tablet Take 1 tablet (1,000 mg) by mouth 2 times daily  Qty: 60 tablet, Refills: 0    Associated Diagnoses: Seizures (H)      nirmatrelvir and ritonavir (PAXLOVID) 300 mg/100 mg therapy pack Take 3  tablets by mouth 2 times daily for 3 days . Finish remainder of Paxlovid therapy pack that was started in the hospital.  Follow instructions on that pack.  Refills: 0    Comments: This order will not be sent to a retail pharmacy. This order is intended for the AVS summary and for continuation of the remainder of the Paxlovid pack dispensed by the inpatient pharmacy at home. Confirm that the patient has received the inpatient pharmacy supplied Paxlovid to take home.  Associated Diagnoses: Infection due to 2019 novel coronavirus           CONTINUE these medications which have CHANGED    Details   amLODIPine (NORVASC) 2.5 MG tablet Take 2 tablets (5 mg) by mouth daily  Qty: 14 tablet, Refills: 0    Associated Diagnoses: Benign essential hypertension      losartan (COZAAR) 25 MG tablet Take 1 tablet (25 mg) by mouth 2 times daily Dont take this medication while you are on Paxlovid. OK to resume after 1 week.    Associated Diagnoses: Benign essential hypertension      lovastatin (MEVACOR) 40 MG tablet Take 1 tablet (40 mg) by mouth At Bedtime Hold this medication till 5 days after you complete the Paxlovid course. OK to resume after that.    Associated Diagnoses: Hyperlipidemia, unspecified hyperlipidemia type      metoprolol tartrate (LOPRESSOR) 100 MG tablet Take 0.5 tablets (50 mg) by mouth 2 times daily  Qty: 14 tablet, Refills: 0    Associated Diagnoses: Benign essential hypertension           CONTINUE these medications which have NOT CHANGED    Details   Acetaminophen 325 MG CAPS Take 325-650 mg by mouth every 4 hours as needed      Ascorbic Acid (VITAMIN C) 500 MG CHEW Take 1 tablet by mouth 2 times daily      calcium carbonate-vitamin D (OSCAL) 500-5 MG-MCG tablet Take 1 tablet by mouth 2 times daily      carboxymethylcellulose PF (REFRESH PLUS) 0.5 % ophthalmic solution Place 1 drop into both eyes daily      fluticasone (FLONASE) 50 MCG/ACT nasal spray Spray 1 spray into both nostrils daily      melatonin 3 MG  tablet Take 3 mg by mouth nightly as needed for sleep           Allergies   No Known Allergies  Data   Most Recent 3 CBC's:  Recent Labs   Lab Test 08/11/23  0832 08/10/23  1110 08/08/23  0650   WBC 5.8 5.7 9.5   HGB 11.8 12.0 12.8   MCV 89 90 89    177 213      Most Recent 3 BMP's:  Recent Labs   Lab Test 08/11/23  0832 08/10/23  1110 08/09/23  0155 08/08/23  0748 08/08/23  0650    138  --   --  142   POTASSIUM 4.0 3.8  --   --  3.8   CHLORIDE 104 103  --   --  104   CO2 28 27  --   --  24   BUN 8.9 7.7*  --   --  17.1   CR 0.75 0.69  --   --  0.65   ANIONGAP 9 8  --   --  14   NORBERTO 8.6* 8.7*  --   --  9.3   * 104* 97   < > 128*    < > = values in this interval not displayed.     Most Recent 2 LFT's:  Recent Labs   Lab Test 08/10/23  1110 08/08/23  0650   AST 45 27   ALT 27 27   ALKPHOS 67 78   BILITOTAL 0.4 0.2     Most Recent INR's and Anticoagulation Dosing History:  Anticoagulation Dose History           No data to display              Most Recent 3 Troponin's:No lab results found.  Most Recent Cholesterol Panel:No lab results found.  Most Recent 6 Bacteria Isolates From Any Culture (See EPIC Reports for Culture Details):No lab results found.  Most Recent TSH, T4 and A1c Labs:No lab results found.    Results for orders placed or performed during the hospital encounter of 08/08/23   CT Head w/o Contrast    Narrative    EXAM: CT HEAD W/O CONTRAST  8/8/2023 8:08 AM     HISTORY: seizures       COMPARISON: None    TECHNIQUE: Using multidetector thin collimation helical acquisition  technique, axial, coronal and sagittal CT images from the skull base  to the vertex were obtained without intravenous contrast.   (topogram) image(s) also obtained and reviewed.    FINDINGS:  No acute intracranial hemorrhage, mass effect, or midline shift.  Scattered areas of subcortical white matter hypoattenuation likely  related to chronic small vessel ischemic disease. Suspect old  cerebellar lacunar infarct.  No CT findings of acute infarct or  hydrocephalus. Preserved subarachnoid spaces.    Right parietal scalp hematoma. No underlying fracture. No substantial  paranasal sinus mucosal disease. Clear mastoid air cells. Nonfocal  orbits.       Impression    IMPRESSION:   1. No acute intracranial pathology.  2. Mild leukoaraiosis, presumed sequela of chronic microvascular  ischemic disease.    BRANDAN BARKLEYDO         SYSTEM ID:  [id number]   MR Brain w/o & w Contrast    Narrative    EXAM: MR BRAIN W/O and W CONTRAST  LOCATION: [facility]  DATE: 8/8/2023    INDICATION: new onset seizure  COMPARISON: CT head 08/08/2023  CONTRAST: 8mL Gadavist  TECHNIQUE: Routine multiplanar multisequence head MRI without and with intravenous contrast.    FINDINGS:  Seizure protocol not performed. No coronal T2 performed. No coronal FLAIR performed. No thin section T1 sequence performed.    INTRACRANIAL CONTENTS: No acute or subacute infarct. No mass, acute hemorrhage, or extra-axial fluid collections. Patchy nonspecific T2/FLAIR hyperintensities within the cerebral white matter most consistent with mild to moderate chronic microvascular   ischemic change. Normal ventricles and sulci. Normal position of the cerebellar tonsils. No pathologic contrast enhancement.    Right inferior cerebellum small chronic infarct.    SELLA: No abnormality accounting for technique.    OSSEOUS STRUCTURES/SOFT TISSUES: Right posterior scalp soft tissue swelling. Normal marrow signal. The major intracranial vascular flow voids are maintained. Right parotid gland 6 mm indeterminate nodule or cyst.    ORBITS: No abnormality accounting for technique.     SINUSES/MASTOIDS: Mild to moderate mucosal thickening scattered about the paranasal sinuses. No middle ear or mastoid effusion.       Impression    IMPRESSION:  1.  No acute infarct.  2.  Right inferior cerebellum small chronic infarct.   3.  Age-related changes described above.  4.  Right  parotid gland 6 mm indeterminate nodule or cyst. Recommend nonemergent ENT referral.     XR Chest 2 Views    Narrative    CHEST TWO VIEWS  8/8/2023 10:41 AM     HISTORY: 68-year-old woman with hypoxia.       Impression    IMPRESSION: Shallow inspiration. Heart size is normal. Pulmonary  vascularity appears prominent, though distinct, though this may relate  to shallow inspiration. No obvious pleural effusion, pneumothorax, or  abnormal area of consolidation. Surgical clips overlie the posterior  right upper thorax.    HUNTER CASTELLANOS MD         SYSTEM ID:  [id number]   XR Chest 2 Views    Narrative    CHEST TWO VIEWS 8/10/2023 9:46 AM     HISTORY: Fever    COMPARISON: Chest radiograph 8/8/2023.       Impression    IMPRESSION: More conspicuous left basilar patchy opacity could relate  to atelectasis, although developing infection is possible. Otherwise  stable lungs. No pneumothorax. Surgical clips over the right apex.  Cardiomediastinal silhouette is unremarkable.     URIEL SCHULZ MD         SYSTEM ID:  [id number]

## 2023-08-11 NOTE — PLAN OF CARE
Goal Outcome Evaluation:       Updates: pt spiked a fever of 102.8, on call hospitalist notified, Tylenol given and brought fever down to 101.0, hospitalist notified, pt is comfortable, no pain, next PRN Tylenol dose due around 0055.

## 2023-08-15 ENCOUNTER — PATIENT OUTREACH (OUTPATIENT)
Dept: CARE COORDINATION | Facility: CLINIC | Age: 68
End: 2023-08-15
Payer: COMMERCIAL

## 2023-08-15 LAB
BACTERIA BLD CULT: NO GROWTH
BACTERIA BLD CULT: NO GROWTH

## 2023-08-15 NOTE — PROGRESS NOTES
Clinic Care Coordination Contact  [facility]: Post-Discharge Note  SITUATION                                                      Admission:    Admission Date: 08/08/23   Reason for Admission: Seizure, new diagnosis  Postictal encephalopathy  COVID-19 infection  Discharge:   Discharge Date: 08/11/23  Discharge Diagnosis: Seizure, new diagnosis  Postictal encephalopathy  COVID-19 infection    BACKGROUND                                                    Nasrin Singer is a 68 year old female with a history of HTN, prior cryptogenic SAH, JABARI, HLD who presents [place] the [place] and had witnessed seizure episode in the ED.     Seizure, new diagnosis  Postictal encephalopathy  Patient was traveling back home from [place] to [place] with a layover in [place].  She remembers going into the bathroom in the airport around 4:30 AM.  She thinks she must have passed out in the bathroom and may have been down for about 30 minutes when she was found by a bystander.  EMS found her alert but not oriented to situation.  In the ED, she was noted to have stable vitals, .  EKG with no acute findings.  Head CT with no acute findings.  Labs including troponin T, Ethyl alcohol completely unremarkable.  Patient had a witnessed generalized tonic-clonic seizure episode in the ED lasting about 30 to 60 seconds which terminated spontaneously.  Patient was given 0.5 Mg IV Ativan, also loaded with 1500 Mg IV Keppra and is admitted under observation status for further management.  She had an unexplained SAH in 2019.  She has also had 2 episodes of loss of awareness, first on 8/2020, second on 4/2023.  She has undergone cardiac and neurological evaluation for these.  Per last neurology note dated 6/21/2023: She recently obtained an EEG which was without epileptic activity but notable left frontal intermittent rhythmic delta activity, intermittent generalized slowing. She also  underwent 30 day cardiac monitor and TTE recently with no acute findings to explain these episodes.    -Consult to neurology.  EEG done that was abnormal with frequent cortical irritability in the left frontotemporal regions.  Patient was given additional IV Keppra 1000Mg evening of 8/8 and started on maintenance Keppra 1000 mg twice daily.  -Neurochecks  -Seizure precautions, as needed IV Ativan available  -MRI brain did not show acute infarct, showed right inferior cerebellum small chronic infarct.  Incidental right parotid gland 6 mm nodule/cyst noted.    -Repeat EEG on 8/9 continue to show focal epileptogenic activity at left temporal region.  Neurology recommended monitoring for 24 hours, can be discharged after that if no clinical spells.  Continue on Keppra 1000 mg twice daily.  -Patient is discharged home in stable condition on 8/11.  Will need follow-up with primary neurologist after she returns home to [place].     COVID-19 infection  Patient spiked fever of 101.9 morning of 8/10.  Also complained of new loose cough.  She tested positive for COVID-19 on 8/10.  Denied any chest pain, shortness of breath, urinary symptoms.  CRP 10.5, other labs unremarkable.  UA with no signs of infection.  CXR showed  left basilar patchy opacity could be related to developing infection.  Otherwise stable lungs.  -Started on Paxlovid course.  Discussed with patient.  -Subcutaneous Lovenox for DVT prophylaxis  -As needed Robitussin-DM  -CRP improving on 8/11  -Patient will plan to likely quarantine in her cousin's home in [place] prior to traveling back to [place].     H/o Cryptogenic SAH  Patient was admitted on 9/28/19 for nontraumatic subarachnoid hemorrhage. She presented to the hospital after sudden onset 10/10 headache. In th ED was diagnosed with SAH. Patient had two catheter angiograms and brain/cervical spine MRI and all unrevealing for vascular anomaly but first dsa complicated by left posterior cerebral  artery strokes and right frontal subcortical stroke s/p with Integrilin; clinically silent.   -Noted     JABARI  -Does not appear to be on treatment     HTN  -Continue PTA metoprolol.  Holding PTA losartan and amlodipine.  -Amlodipine resumed at discharge.  Patient will resume losartan after she returns back home to [place].     HLD  -Hold PTA lovastatin while on Paxlovid.     Martine Donaldson MD, MD             ASSESSMENT           Discharge Assessment  How are your symptoms? (Red Flag symptoms escalate to triage hotline per guidelines): Improved  Do you feel your condition is stable enough to be safe at home until your provider visit?: Yes  Does the patient have their discharge instructions? : Yes  Does the patient have questions regarding their discharge instructions? : No  Were you started on any new medications or were there changes to any of your previous medications? : Yes  Does the patient have all of their medications?: Yes  Do you have questions regarding any of your medications? : No  Do you have all of your needed medical supplies or equipment (DME)?  (i.e. oxygen tank, CPAP, cane, etc.): Yes  Discharge follow-up appointment scheduled within 14 calendar days? : Yes  Discharge Follow Up Appointment Date: 08/15/23  Discharge Follow Up Appointment Scheduled with?: Primary Care Provider (Virtual appointment)    Post-op ([facility] Only)  If the patient had a surgery or procedure, do they have any questions for a nurse?: No    Post-op (Clinicians Only)  Did the patient have surgery or a procedure: No      PLAN                                                      Outpatient Plan:  Follow-up Appointments     Follow-up and recommended labs and tests       Follow up with primary care provider, Physician No Ref-Primary, within 7   days for hospital follow- up.  No follow up labs or test are needed.  Follow-up with primary neurologist at next available appointment         No future appointments.      For any urgent  concerns, please contact our 24 hour nurse triage line: [phone] ([phone])         TATIANA Connolly

## 2023-08-15 NOTE — PROGRESS NOTES
Clinic Care Coordination Contact  [facility]: Post-Discharge Note  SITUATION                                                      Admission:    Admission Date: 08/08/23   Reason for Admission: Seizure, new diagnosis  Postictal encephalopathy  COVID-19 infection  Discharge:   Discharge Date: 08/11/23  Discharge Diagnosis: Seizure, new diagnosis  Postictal encephalopathy  COVID-19 infection    BACKGROUND                                                    Nasrin Singer is a 68 year old female with a history of HTN, prior cryptogenic SAH, JABARI, HLD who presents after being found down on the [place] floor in the airport and had witnessed seizure episode in the ED.     Seizure, new diagnosis  Postictal encephalopathy  Patient was traveling back home from [place] to [place] with a layover in [place].  She remembers going into the bathroom in the airport around 4:30 AM.  She thinks she must have passed out in the bathroom and may have been down for about 30 minutes when she was found by a bystander.  EMS found her alert but not oriented to situation.  In the ED, she was noted to have stable vitals, .  EKG with no acute findings.  Head CT with no acute findings.  Labs including troponin T, Ethyl alcohol completely unremarkable.  Patient had a witnessed generalized tonic-clonic seizure episode in the ED lasting about 30 to 60 seconds which terminated spontaneously.  Patient was given 0.5 Mg IV Ativan, also loaded with 1500 Mg IV Keppra and is admitted under observation status for further management.  She had an unexplained SAH in 2019.  She has also had 2 episodes of loss of awareness, first on 8/2020, second on 4/2023.  She has undergone cardiac and neurological evaluation for these.  Per last neurology note dated 6/21/2023: She recently obtained an EEG which was without epileptic activity but notable left frontal intermittent rhythmic delta activity, intermittent generalized slowing. She also  underwent 30 day cardiac monitor and TTE recently with no acute findings to explain these episodes.    -Consult to neurology.  EEG done that was abnormal with frequent cortical irritability in the left frontotemporal regions.  Patient was given additional IV Keppra 1000Mg evening of 8/8 and started on maintenance Keppra 1000 mg twice daily.  -Neurochecks  -Seizure precautions, as needed IV Ativan available  -MRI brain did not show acute infarct, showed right inferior cerebellum small chronic infarct.  Incidental right parotid gland 6 mm nodule/cyst noted.    -Repeat EEG on 8/9 continue to show focal epileptogenic activity at left temporal region.  Neurology recommended monitoring for 24 hours, can be discharged after that if no clinical spells.  Continue on Keppra 1000 mg twice daily.  -Patient is discharged home in stable condition on 8/11.  Will need follow-up with primary neurologist after she returns home to [place].     COVID-19 infection  Patient spiked fever of 101.9 morning of 8/10.  Also complained of new loose cough.  She tested positive for COVID-19 on 8/10.  Denied any chest pain, shortness of breath, urinary symptoms.  CRP 10.5, other labs unremarkable.  UA with no signs of infection.  CXR showed  left basilar patchy opacity could be related to developing infection.  Otherwise stable lungs.  -Started on Paxlovid course.  Discussed with patient.  -Subcutaneous Lovenox for DVT prophylaxis  -As needed Robitussin-DM  -CRP improving on 8/11  -Patient will plan to likely quarantine in her cousin's home in [place] prior to traveling back to [place].     H/o Cryptogenic SAH  Patient was admitted on 9/28/19 for nontraumatic subarachnoid hemorrhage. She presented to the hospital after sudden onset 10/10 headache. In th ED was diagnosed with SAH. Patient had two catheter angiograms and brain/cervical spine MRI and all unrevealing for vascular anomaly but first dsa complicated by left posterior cerebral  artery strokes and right frontal subcortical stroke s/p with Integrilin; clinically silent.   -Noted     JABARI  -Does not appear to be on treatment     HTN  -Continue PTA metoprolol.  Holding PTA losartan and amlodipine.  -Amlodipine resumed at discharge.  Patient will resume losartan after she returns back home to [place].     HLD  -Hold PTA lovastatin while on Paxlovid.     Martine Donaldson MD, MD       ASSESSMENT           Discharge Assessment  How are you doing now that you are home?: Patient shares that she is doing well and is trying to rest and recover. Has a follow up call with her PCP from [place]  today and will follow up with neurology in early september. No concerns or needs at this time.  How are your symptoms? (Red Flag symptoms escalate to triage hotline per guidelines): Improved  Do you feel your condition is stable enough to be safe at home until your provider visit?: Yes  Does the patient have their discharge instructions? : Yes  Does the patient have questions regarding their discharge instructions? : No  Were you started on any new medications or were there changes to any of your previous medications? : Yes  Does the patient have all of their medications?: Yes  Do you have questions regarding any of your medications? : No  Do you have all of your needed medical supplies or equipment (DME)?  (i.e. oxygen tank, CPAP, cane, etc.): Yes  Discharge follow-up appointment scheduled within 14 calendar days? : Yes  Discharge Follow Up Appointment Date: 08/15/23  Discharge Follow Up Appointment Scheduled with?: Primary Care Provider (Virtual appointment)    Post-op ([facility] Only)  If the patient had a surgery or procedure, do they have any questions for a nurse?: No    Post-op (Clinicians Only)  Did the patient have surgery or a procedure: No        PLAN                                                      Outpatient Plan:  Follow-up Appointments     Follow-up and recommended labs and tests       Follow up with  primary care provider, Physician No Ref-Primary, within 7   days for hospital follow- up.  No follow up labs or test are needed.  Follow-up with primary neurologist at next available appointment      No future appointments.      For any urgent concerns, please contact our 24 hour nurse triage line: [phone] ([phone])         TATIANA Connolly